# Patient Record
Sex: FEMALE | Race: WHITE | NOT HISPANIC OR LATINO
[De-identification: names, ages, dates, MRNs, and addresses within clinical notes are randomized per-mention and may not be internally consistent; named-entity substitution may affect disease eponyms.]

---

## 2019-01-04 ENCOUNTER — RECORD ABSTRACTING (OUTPATIENT)
Age: 49
End: 2019-01-04

## 2019-01-04 DIAGNOSIS — Z86.39 PERSONAL HISTORY OF OTHER ENDOCRINE, NUTRITIONAL AND METABOLIC DISEASE: ICD-10-CM

## 2019-01-04 DIAGNOSIS — F51.04 PSYCHOPHYSIOLOGIC INSOMNIA: ICD-10-CM

## 2019-01-04 DIAGNOSIS — Z82.49 FAMILY HISTORY OF ISCHEMIC HEART DISEASE AND OTHER DISEASES OF THE CIRCULATORY SYSTEM: ICD-10-CM

## 2019-01-04 DIAGNOSIS — Z87.39 PERSONAL HISTORY OF OTHER DISEASES OF THE MUSCULOSKELETAL SYSTEM AND CONNECTIVE TISSUE: ICD-10-CM

## 2019-01-04 DIAGNOSIS — N95.9 UNSPECIFIED MENOPAUSAL AND PERIMENOPAUSAL DISORDER: ICD-10-CM

## 2019-01-04 DIAGNOSIS — Z86.59 PERSONAL HISTORY OF OTHER MENTAL AND BEHAVIORAL DISORDERS: ICD-10-CM

## 2019-01-04 DIAGNOSIS — Z80.3 FAMILY HISTORY OF MALIGNANT NEOPLASM OF BREAST: ICD-10-CM

## 2019-01-04 DIAGNOSIS — Z87.891 PERSONAL HISTORY OF NICOTINE DEPENDENCE: ICD-10-CM

## 2019-01-04 DIAGNOSIS — J30.89 OTHER ALLERGIC RHINITIS: ICD-10-CM

## 2019-01-04 RX ORDER — MELATONIN 3 MG
TABLET, EXTENDED RELEASE ORAL
Refills: 0 | Status: ACTIVE | COMMUNITY

## 2019-01-04 RX ORDER — VENLAFAXINE HYDROCHLORIDE 75 MG/1
75 CAPSULE, EXTENDED RELEASE ORAL
Refills: 0 | Status: ACTIVE | COMMUNITY

## 2019-01-04 RX ORDER — RANITIDINE HYDROCHLORIDE 300 MG/1
300 CAPSULE ORAL
Refills: 0 | Status: ACTIVE | COMMUNITY

## 2019-01-04 RX ORDER — DICLOFENAC SODIUM 1 %
1 KIT TOPICAL
Refills: 0 | Status: ACTIVE | COMMUNITY

## 2019-01-04 RX ORDER — TRIAMCINOLONE ACETONIDE 55 UG/1
55 SPRAY, METERED NASAL
Refills: 0 | Status: ACTIVE | COMMUNITY

## 2019-01-04 RX ORDER — LEVALBUTEROL HYDROCHLORIDE 0.63 MG/3ML
0.63 SOLUTION RESPIRATORY (INHALATION)
Refills: 0 | Status: ACTIVE | COMMUNITY

## 2019-01-04 RX ORDER — AZELASTINE HYDROCHLORIDE AND FLUTICASONE PROPIONATE 137; 50 UG/1; UG/1
137-50 SPRAY, METERED NASAL
Refills: 0 | Status: ACTIVE | COMMUNITY

## 2019-01-04 RX ORDER — MONTELUKAST 10 MG/1
10 TABLET, FILM COATED ORAL
Refills: 0 | Status: ACTIVE | COMMUNITY

## 2019-01-09 ENCOUNTER — APPOINTMENT (OUTPATIENT)
Dept: ENDOCRINOLOGY | Facility: CLINIC | Age: 49
End: 2019-01-09
Payer: MEDICARE

## 2019-01-09 DIAGNOSIS — N91.2 AMENORRHEA, UNSPECIFIED: ICD-10-CM

## 2019-01-09 PROCEDURE — 36415 COLL VENOUS BLD VENIPUNCTURE: CPT

## 2019-01-16 ENCOUNTER — APPOINTMENT (OUTPATIENT)
Dept: ENDOCRINOLOGY | Facility: CLINIC | Age: 49
End: 2019-01-16

## 2019-01-16 LAB
25(OH)D3 SERPL-MCNC: 40.2 NG/ML
ANION GAP SERPL CALC-SCNC: 14 MMOL/L
BUN SERPL-MCNC: 16 MG/DL
CALCIUM SERPL-MCNC: 9.4 MG/DL
CALCIUM SERPL-MCNC: 9.4 MG/DL
CHLORIDE SERPL-SCNC: 102 MMOL/L
CO2 SERPL-SCNC: 25 MMOL/L
CREAT SERPL-MCNC: 0.83 MG/DL
FSH SERPL-MCNC: 20.9 IU/L
GLUCOSE SERPL-MCNC: 75 MG/DL
LH SERPL-ACNC: 11 IU/L
MAGNESIUM SERPL-MCNC: 2.2 MG/DL
PARATHYROID HORMONE INTACT: 41 PG/ML
PHOSPHATE SERPL-MCNC: 3.7 MG/DL
POTASSIUM SERPL-SCNC: 5.4 MMOL/L
SODIUM SERPL-SCNC: 141 MMOL/L
T4 FREE SERPL-MCNC: 1.7 NG/DL
TSH SERPL-ACNC: 0.84 UIU/ML

## 2019-03-14 ENCOUNTER — RECORD ABSTRACTING (OUTPATIENT)
Age: 49
End: 2019-03-14

## 2019-03-14 ENCOUNTER — APPOINTMENT (OUTPATIENT)
Dept: CARDIOLOGY | Facility: CLINIC | Age: 49
End: 2019-03-14
Payer: MEDICARE

## 2019-03-14 VITALS
DIASTOLIC BLOOD PRESSURE: 74 MMHG | BODY MASS INDEX: 27.19 KG/M2 | SYSTOLIC BLOOD PRESSURE: 120 MMHG | HEIGHT: 61 IN | WEIGHT: 144 LBS

## 2019-03-14 PROCEDURE — 99214 OFFICE O/P EST MOD 30 MIN: CPT

## 2019-03-14 PROCEDURE — 36415 COLL VENOUS BLD VENIPUNCTURE: CPT

## 2019-03-14 RX ORDER — AZELASTINE HYDROCHLORIDE AND FLUTICASONE PROPIONATE 137; 50 UG/1; UG/1
137-50 SPRAY, METERED NASAL
Refills: 0 | Status: DISCONTINUED | COMMUNITY
End: 2019-03-14

## 2019-03-14 RX ORDER — RANITIDINE 75 MG/1
TABLET ORAL
Refills: 0 | Status: DISCONTINUED | COMMUNITY
End: 2019-03-14

## 2019-03-14 NOTE — PHYSICAL EXAM
[General Appearance - Well Developed] : well developed [Normal Appearance] : normal appearance [Well Groomed] : well groomed [General Appearance - Well Nourished] : well nourished [No Deformities] : no deformities [General Appearance - In No Acute Distress] : no acute distress [Normal Conjunctiva] : the conjunctiva exhibited no abnormalities [Eyelids - No Xanthelasma] : the eyelids demonstrated no xanthelasmas [Normal Oral Mucosa] : normal oral mucosa [No Oral Pallor] : no oral pallor [No Oral Cyanosis] : no oral cyanosis [Normal Jugular Venous A Waves Present] : normal jugular venous A waves present [Normal Jugular Venous V Waves Present] : normal jugular venous V waves present [No Jugular Venous Bull A Waves] : no jugular venous bull A waves [Respiration, Rhythm And Depth] : normal respiratory rhythm and effort [Exaggerated Use Of Accessory Muscles For Inspiration] : no accessory muscle use [Auscultation Breath Sounds / Voice Sounds] : lungs were clear to auscultation bilaterally [Abdomen Soft] : soft [Abdomen Tenderness] : non-tender [Abdomen Mass (___ Cm)] : no abdominal mass palpated [Abnormal Walk] : normal gait [Gait - Sufficient For Exercise Testing] : the gait was sufficient for exercise testing [Nail Clubbing] : no clubbing of the fingernails [Cyanosis, Localized] : no localized cyanosis [Petechial Hemorrhages (___cm)] : no petechial hemorrhages [Skin Color & Pigmentation] : normal skin color and pigmentation [] : no rash [No Venous Stasis] : no venous stasis [Skin Lesions] : no skin lesions [No Skin Ulcers] : no skin ulcer [No Xanthoma] : no  xanthoma was observed [Oriented To Time, Place, And Person] : oriented to person, place, and time [Affect] : the affect was normal [Mood] : the mood was normal [No Anxiety] : not feeling anxious [5th Left ICS - MCL] : palpated at the 5th LICS in the midclavicular line [Normal] : normal [Normal Rate] : normal [Rhythm Regular] : regular [Normal S1] : normal S1 [Normal S2] : normal S2 [No Murmur] : no murmurs heard [No Abnormalities] : the abdominal aorta was not enlarged and no bruit was heard [Right Carotid Bruit] : no bruit heard over the right carotid [Left Carotid Bruit] : no bruit heard over the left carotid [Right Femoral Bruit] : no bruit heard over the right femoral artery [Left Femoral Bruit] : no bruit heard over the left femoral artery [Rt] : no varicose veins of the right leg [Lt] : no varicose veins of the left leg

## 2019-03-14 NOTE — HISTORY OF PRESENT ILLNESS
[FreeTextEntry1] :        The patient is a 48 year-old woman, daughter of MrMeg and Mrs. Camilo Ca, and who is well known to me from my previous practice at [facility]. She is under management of:\par        1) hyperlipidemia, on statin (Atorva)\par        2) long-standing asthma.\par        3) Treated Hypothyroidism, per Endo (Dr. Wolff)\par        4) Headache syndrome, per neuro (Dr. FLAQUITA Silva)\par        Currently: She is feeling well. She is continuing to take her therapy with Lipitor 10 mg once nightly. She is tolerating nicely.\par        Her asthma remains under good control. She is also using Synthroid chronically for hypothyroidism. \par        Currently: has been taking and tolerating Lipitor without issue. No side effects. She is now used to it. Has seen Dr. Kaufman; had flu vaccination last fall.\par        And, has been seeing neuro about her recurrent and frequent headaches.\par        **Going to Dr. Ludin Dia for pulmonary, re: Asthma and Cough.\par        And, ENT (Dr. King)

## 2019-03-14 NOTE — ASSESSMENT
[FreeTextEntry1] :  Assessment: \par 1. Mixed hyperlipidemia - E78.2 (Primary)  \par 2. Asthma - J45.909  \par 3. Hypothyroidism - E03.9  \par 4. Vitamin D deficiency - E55.9  \par 5. Headache - R51  \par 6. Primary insomnia - F51.01   \par \par Discussion:\par  1) Hyperlipidemia- on Lipitor. At goal.\par 2) Hypothyroidism and osteopenia--per [place] (Dr. Wolff)\par 3) Asthma-stable\par 4) Insomnia-chronic X many, many years. Agree with melatonin. I have recommended an exercise van too. She gets no exercise. \par

## 2019-03-15 LAB
ALBUMIN SERPL ELPH-MCNC: 4.6 G/DL
ALP BLD-CCNC: 84 U/L
ALT SERPL-CCNC: 13 U/L
ANION GAP SERPL CALC-SCNC: 13 MMOL/L
AST SERPL-CCNC: 20 U/L
BASOPHILS # BLD AUTO: 0.03 K/UL
BASOPHILS NFR BLD AUTO: 0.5 %
BILIRUB SERPL-MCNC: 0.3 MG/DL
BUN SERPL-MCNC: 15 MG/DL
CALCIUM SERPL-MCNC: 9.4 MG/DL
CHLORIDE SERPL-SCNC: 99 MMOL/L
CHOLEST SERPL-MCNC: 185 MG/DL
CHOLEST/HDLC SERPL: 3.9 RATIO
CO2 SERPL-SCNC: 27 MMOL/L
CREAT SERPL-MCNC: 0.81 MG/DL
EOSINOPHIL # BLD AUTO: 0.12 K/UL
EOSINOPHIL NFR BLD AUTO: 1.9 %
GLUCOSE SERPL-MCNC: 101 MG/DL
HCT VFR BLD CALC: 44.2 %
HDLC SERPL-MCNC: 48 MG/DL
HGB BLD-MCNC: 13.5 G/DL
IMM GRANULOCYTES NFR BLD AUTO: 0.2 %
LDLC SERPL CALC-MCNC: 111 MG/DL
LYMPHOCYTES # BLD AUTO: 2.89 K/UL
LYMPHOCYTES NFR BLD AUTO: 44.8 %
MAN DIFF?: NORMAL
MCHC RBC-ENTMCNC: 28.8 PG
MCHC RBC-ENTMCNC: 30.5 GM/DL
MCV RBC AUTO: 94.4 FL
MONOCYTES # BLD AUTO: 0.5 K/UL
MONOCYTES NFR BLD AUTO: 7.8 %
NEUTROPHILS # BLD AUTO: 2.9 K/UL
NEUTROPHILS NFR BLD AUTO: 44.8 %
PLATELET # BLD AUTO: 221 K/UL
POTASSIUM SERPL-SCNC: 4.3 MMOL/L
PROT SERPL-MCNC: 7.1 G/DL
RBC # BLD: 4.68 M/UL
RBC # FLD: 13.4 %
SODIUM SERPL-SCNC: 139 MMOL/L
TRIGL SERPL-MCNC: 132 MG/DL
WBC # FLD AUTO: 6.45 K/UL

## 2019-03-20 NOTE — PHYSICAL EXAM
[General Appearance - Well Developed] : well developed [Normal Appearance] : normal appearance [Well Groomed] : well groomed [General Appearance - Well Nourished] : well nourished [No Deformities] : no deformities [General Appearance - In No Acute Distress] : no acute distress [Normal Conjunctiva] : the conjunctiva exhibited no abnormalities [Eyelids - No Xanthelasma] : the eyelids demonstrated no xanthelasmas [Normal Oral Mucosa] : normal oral mucosa [No Oral Pallor] : no oral pallor [No Oral Cyanosis] : no oral cyanosis [Normal Jugular Venous A Waves Present] : normal jugular venous A waves present [Normal Jugular Venous V Waves Present] : normal jugular venous V waves present [No Jugular Venous Bull A Waves] : no jugular venous bull A waves [Respiration, Rhythm And Depth] : normal respiratory rhythm and effort [Exaggerated Use Of Accessory Muscles For Inspiration] : no accessory muscle use [Auscultation Breath Sounds / Voice Sounds] : lungs were clear to auscultation bilaterally [5th Left ICS - MCL] : palpated at the 5th LICS in the midclavicular line [Normal] : normal [Normal Rate] : normal [Rhythm Regular] : regular [Normal S1] : normal S1 [Normal S2] : normal S2 [No Murmur] : no murmurs heard [No Abnormalities] : the abdominal aorta was not enlarged and no bruit was heard [Abdomen Soft] : soft [Abdomen Tenderness] : non-tender [Abdomen Mass (___ Cm)] : no abdominal mass palpated [Abnormal Walk] : normal gait [Gait - Sufficient For Exercise Testing] : the gait was sufficient for exercise testing [Nail Clubbing] : no clubbing of the fingernails [Cyanosis, Localized] : no localized cyanosis [Petechial Hemorrhages (___cm)] : no petechial hemorrhages [Skin Color & Pigmentation] : normal skin color and pigmentation [] : no rash [No Venous Stasis] : no venous stasis [Skin Lesions] : no skin lesions [No Skin Ulcers] : no skin ulcer [No Xanthoma] : no  xanthoma was observed [Oriented To Time, Place, And Person] : oriented to person, place, and time [Affect] : the affect was normal [Mood] : the mood was normal [No Anxiety] : not feeling anxious [Right Carotid Bruit] : no bruit heard over the right carotid [Left Carotid Bruit] : no bruit heard over the left carotid [Right Femoral Bruit] : no bruit heard over the right femoral artery [Left Femoral Bruit] : no bruit heard over the left femoral artery [Rt] : no varicose veins of the right leg [Lt] : no varicose veins of the left leg

## 2019-03-20 NOTE — HISTORY OF PRESENT ILLNESS
[FreeTextEntry1] :        The patient is a 48 year-old woman, daughter of MrMeg and Mrs. Camilo Ca, and who is well known to me from my previous practice at [facility]. She is under management of:\par        1) hyperlipidemia, on statin (Atorva)\par        2) long-standing asthma.\par        3) Treated Hypothyroidism, per Endo (Dr. Wolff)\par        4) Headache syndrome, per neuro (Dr. FLAQUITA Silva)\par        Currently: She is feeling well. She is continuing to take her therapy with Lipitor 10 mg once nightly. She is tolerating nicely.\par        Her asthma remains under good control. She is also using Synthroid chronically for hypothyroidism. \par        Currently: has been taking and tolerating Lipitor without issue. No side effects. She is now used to it. Has seen Dr. Kaufman; had flu vaccination last fall.\par        And, has been seeing neuro about her recurrent and frequent headaches.\par        **Going to Dr. Ludin Dia for pulmonary, re: Asthma and Cough.\par        And, ENT (Dr. King)

## 2019-03-27 ENCOUNTER — APPOINTMENT (OUTPATIENT)
Dept: ENDOCRINOLOGY | Facility: CLINIC | Age: 49
End: 2019-03-27
Payer: MEDICARE

## 2019-03-27 PROCEDURE — 36415 COLL VENOUS BLD VENIPUNCTURE: CPT

## 2019-03-29 ENCOUNTER — APPOINTMENT (OUTPATIENT)
Dept: ENDOCRINOLOGY | Facility: CLINIC | Age: 49
End: 2019-03-29
Payer: MEDICARE

## 2019-03-29 VITALS
HEART RATE: 74 BPM | SYSTOLIC BLOOD PRESSURE: 114 MMHG | BODY MASS INDEX: 27.38 KG/M2 | WEIGHT: 145 LBS | HEIGHT: 61 IN | DIASTOLIC BLOOD PRESSURE: 72 MMHG

## 2019-03-29 LAB
25(OH)D3 SERPL-MCNC: 44.1 NG/ML
CALCIUM SERPL-MCNC: 9.3 MG/DL
FSH SERPL-MCNC: 19.9 IU/L
LH SERPL-ACNC: 11.2 IU/L
MAGNESIUM SERPL-MCNC: 2 MG/DL
PARATHYROID HORMONE INTACT: 57 PG/ML
PHOSPHATE SERPL-MCNC: 3.6 MG/DL
T4 FREE SERPL-MCNC: 1.6 NG/DL
TSH SERPL-ACNC: 0.55 UIU/ML

## 2019-03-29 PROCEDURE — 99214 OFFICE O/P EST MOD 30 MIN: CPT

## 2019-03-29 RX ORDER — CHROMIUM 200 MCG
1000 TABLET ORAL
Refills: 0 | Status: DISCONTINUED | COMMUNITY
End: 2019-03-29

## 2019-03-29 RX ORDER — VENLAFAXINE HYDROCHLORIDE 150 MG/1
150 CAPSULE, EXTENDED RELEASE ORAL
Refills: 0 | Status: DISCONTINUED | COMMUNITY
End: 2019-03-29

## 2019-03-29 NOTE — HISTORY OF PRESENT ILLNESS
[FreeTextEntry1] : Ms. ALY ARMAS is a 48 year old female\par coming for f/u hypothyroidism, osteoporosis new , has also early menopause last period 2013\par last seen 6/18\par one Prolia 2/19, first was 8/18 so far rerecived twice with no side effects\par \par        Dr Raz ONOFRE started her on Omeprazole 10mg bid \par        on Vit D 2,000 IU qd for at least a month\par        has osteoporosis \par        had not been taking her meds end of December, also January and February each time for a week at a time due to gastroenteritis\par        labs reviewed with the pt, has low IGF1\par        TFts normal\par        has dot-cisterna magna sees Dr Silva Neurology last CT scan and last MRI head reviewed\par        labs done 2/19/15 showed TSH 2.6, FT4 0.8, none since thyroid was changed\par        has been on Synthroid since she was 16yo, on Synthroid 88mcg NAMAN changed 9/2016\par        has Osteopenia DEXA scan done by her Gyn at [facility] referred at [facility] done 1/17 mildy decreased , T score -2 both hip and spine \par        calcium 500+ Vit D qd takes it 4hr after Synthroid \par        iPTH 60, \par         takes Vit D 2,000 IU qd \par        no persistent Therapy with prednisone, has asthma \par        has been on Phentermine by PCP for 3 months lost 20lb , then 10lb more since she was sick\par        thyroid US reviewed showed small nodule 5mm nearby her thyroid, lymph node versus parathyroid adenoma, however iPTH 60 with low D, will repeat once D level is normal

## 2019-04-09 ENCOUNTER — RESULT REVIEW (OUTPATIENT)
Age: 49
End: 2019-04-09

## 2019-07-17 ENCOUNTER — LABORATORY RESULT (OUTPATIENT)
Age: 49
End: 2019-07-17

## 2019-07-17 ENCOUNTER — APPOINTMENT (OUTPATIENT)
Dept: ENDOCRINOLOGY | Facility: CLINIC | Age: 49
End: 2019-07-17
Payer: MEDICARE

## 2019-07-17 PROCEDURE — ZZZZZ: CPT

## 2019-07-18 LAB
25(OH)D3 SERPL-MCNC: 40 NG/ML
ALBUMIN SERPL ELPH-MCNC: 4.5 G/DL
ALP BLD-CCNC: 73 U/L
ALT SERPL-CCNC: 16 U/L
ANION GAP SERPL CALC-SCNC: 12 MMOL/L
AST SERPL-CCNC: 21 U/L
BILIRUB SERPL-MCNC: 0.3 MG/DL
BUN SERPL-MCNC: 14 MG/DL
CALCIUM SERPL-MCNC: 9.6 MG/DL
CALCIUM SERPL-MCNC: 9.6 MG/DL
CHLORIDE SERPL-SCNC: 100 MMOL/L
CO2 SERPL-SCNC: 28 MMOL/L
CREAT SERPL-MCNC: 0.93 MG/DL
GLUCOSE SERPL-MCNC: 91 MG/DL
MAGNESIUM SERPL-MCNC: 1.9 MG/DL
PARATHYROID HORMONE INTACT: 37 PG/ML
POTASSIUM SERPL-SCNC: 4 MMOL/L
PROT SERPL-MCNC: 6.9 G/DL
SODIUM SERPL-SCNC: 140 MMOL/L
T4 FREE SERPL-MCNC: 1.3 NG/DL
TSH SERPL-ACNC: 1.16 UIU/ML

## 2019-07-19 LAB
CHOLEST SERPL-MCNC: 197 MG/DL
CHOLEST/HDLC SERPL: 3.3 RATIO
HDLC SERPL-MCNC: 59 MG/DL
LDLC SERPL CALC-MCNC: 113 MG/DL
TRIGL SERPL-MCNC: 123 MG/DL

## 2019-07-24 ENCOUNTER — APPOINTMENT (OUTPATIENT)
Dept: ENDOCRINOLOGY | Facility: CLINIC | Age: 49
End: 2019-07-24
Payer: MEDICARE

## 2019-07-24 VITALS
OXYGEN SATURATION: 99 % | WEIGHT: 149 LBS | BODY MASS INDEX: 28.13 KG/M2 | HEIGHT: 61 IN | RESPIRATION RATE: 16 BRPM | DIASTOLIC BLOOD PRESSURE: 70 MMHG | SYSTOLIC BLOOD PRESSURE: 116 MMHG | HEART RATE: 78 BPM

## 2019-07-24 LAB
COLLAGEN NTX UR-SCNC: 10
CREAT UR-MCNC: 127 MG/DL

## 2019-07-24 PROCEDURE — 99214 OFFICE O/P EST MOD 30 MIN: CPT

## 2019-07-24 RX ORDER — ONDANSETRON 4 MG/1
4 TABLET ORAL
Refills: 0 | Status: DISCONTINUED | COMMUNITY
End: 2019-07-24

## 2019-07-24 NOTE — REVIEW OF SYSTEMS
[Fatigue] : fatigue [Joint Pain] : joint pain [Back Pain] : back pain [Headache] : headaches [Dry Skin] : dry skin [Depression] : depression [Anxiety] : anxiety [FreeTextEntry8] : last period 41 yo , has had irregular periods before  [Negative] : Heme/Lymph

## 2019-07-24 NOTE — PHYSICAL EXAM
[Alert] : alert [No Acute Distress] : no acute distress [Well Nourished] : well nourished [Normal Sclera/Conjunctiva] : normal sclera/conjunctiva [EOMI] : extra ocular movement intact [Well Developed] : well developed [No Proptosis] : no proptosis [Normal Oropharynx] : the oropharynx was normal [No Accessory Muscle Use] : no accessory muscle use [No Respiratory Distress] : no respiratory distress [Clear to Auscultation] : lungs were clear to auscultation bilaterally [Normal Rate] : heart rate was normal  [Normal S1, S2] : normal S1 and S2 [Regular Rhythm] : with a regular rhythm [Pedal Pulses Normal] : the pedal pulses are present [No Edema] : there was no peripheral edema [Soft] : abdomen soft [Not Tender] : non-tender [Normal Bowel Sounds] : normal bowel sounds [Not Distended] : not distended [Post Cervical Nodes] : posterior cervical nodes [Anterior Cervical Nodes] : anterior cervical nodes [Axillary Nodes] : axillary nodes [Normal] : normal and non tender [No Spinal Tenderness] : no spinal tenderness [Normal Gait] : normal gait [Spine Straight] : spine straight [No Stigmata of Cushings Syndrome] : no stigmata of cushings syndrome [No Rash] : no rash [Normal Strength/Tone] : muscle strength and tone were normal [Acanthosis Nigricans] : no acanthosis nigricans [Normal Reflexes] : deep tendon reflexes were 2+ and symmetric [No Tremors] : no tremors [Oriented x3] : oriented to person, place, and time [de-identified] : mildly enlarged nodular thyroid

## 2019-07-24 NOTE — HISTORY OF PRESENT ILLNESS
[FreeTextEntry1] : Ms. ALY ARMAS is a 48 year old female\par coming for f/u hypothyroidism, osteoporosis new , has also early menopause last period 2013\par last seen 6/18\par one Prolia 2/19, first was 8/18 so far received twice with no side effects\par \par        Dr Raz ONOFRE started her on Omeprazole 10mg bid \par        on Vit D 2,000 IU qd for at least a month\par        has osteoporosis \par        had not been taking her meds end of December, also January and February each time for a week at a time due to gastroenteritis\par        labs reviewed with the pt, has low IGF1\par        TFts normal\par        has dot-cisterna magna sees Dr Silva Neurology last CT scan and last MRI head reviewed\par        labs done 2/19/15 showed TSH 2.6, FT4 0.8, none since thyroid was changed\par        has been on Synthroid since she was 16yo, on Synthroid 88mcg NAMAN changed 9/2016\par        has Osteopenia DEXA scan done by her Gyn at [facility] referred at [facility] done 1/17 mildy decreased , T score -2 both hip and spine \par repeated DEXA 5/19 c/w osteoporosis T score -2.6 left femur, improved , forearm osteopenia -2.0\par        calcium 500+ Vit D qd takes it 4hr after Synthroid \par        iPTH 60, \par         takes Vit D 2,000 IU qd \par        no persistent Therapy with prednisone, has asthma \par        has been on Phentermine by PCP for 3 months lost 20lb , then 10lb more since she was sick\par        thyroid US reviewed showed small nodule 5mm nearby her thyroid, lymph node versus parathyroid adenoma, however iPTH 60 with low D, will repeat once D level is normal\par \par labs reviewed very good

## 2019-09-05 ENCOUNTER — APPOINTMENT (OUTPATIENT)
Dept: CARDIOLOGY | Facility: CLINIC | Age: 49
End: 2019-09-05
Payer: MEDICARE

## 2019-09-05 ENCOUNTER — NON-APPOINTMENT (OUTPATIENT)
Age: 49
End: 2019-09-05

## 2019-09-05 VITALS
DIASTOLIC BLOOD PRESSURE: 64 MMHG | WEIGHT: 149 LBS | BODY MASS INDEX: 28.13 KG/M2 | HEIGHT: 61 IN | SYSTOLIC BLOOD PRESSURE: 102 MMHG

## 2019-09-05 PROCEDURE — 36415 COLL VENOUS BLD VENIPUNCTURE: CPT

## 2019-09-05 PROCEDURE — 93000 ELECTROCARDIOGRAM COMPLETE: CPT

## 2019-09-05 PROCEDURE — 99214 OFFICE O/P EST MOD 30 MIN: CPT

## 2019-09-05 NOTE — HISTORY OF PRESENT ILLNESS
[FreeTextEntry1] :        The patient is a 48 year-old woman, daughter of MrMeg and Mrs. Camilo Ca, and who is well known to me from my previous practice at [facility]. She is under management of:\par        1) hyperlipidemia, on statin (Atorva)\par        2) long-standing asthma.\par        3) Treated Hypothyroidism, per Endo (Dr. Wolff)\par        4) Headache syndrome, per neuro (Dr. FLAQUITA Silva)\par        Currently: She is feeling well. She is continuing to take her therapy with Lipitor 10 mg once nightly. She is tolerating nicely.\par        Her asthma remains under good control. She is also using Synthroid chronically for hypothyroidism. \par        Currently: has been taking and tolerating Lipitor without issue. No side effects. She is now used to it. Has seen Dr. Kaufman; had flu vaccination last fall.\par        And, has been seeing neuro about her recurrent and frequent headaches.\par        **Going to Dr. Ludin Dia for pulmonary, re: Asthma and Cough.\par        And, ENT (Dr. King)

## 2019-09-05 NOTE — PHYSICAL EXAM
[General Appearance - Well Developed] : well developed [Normal Appearance] : normal appearance [Well Groomed] : well groomed [General Appearance - Well Nourished] : well nourished [No Deformities] : no deformities [General Appearance - In No Acute Distress] : no acute distress [Normal Conjunctiva] : the conjunctiva exhibited no abnormalities [Eyelids - No Xanthelasma] : the eyelids demonstrated no xanthelasmas [Normal Oral Mucosa] : normal oral mucosa [No Oral Pallor] : no oral pallor [No Oral Cyanosis] : no oral cyanosis [Normal Jugular Venous A Waves Present] : normal jugular venous A waves present [Normal Jugular Venous V Waves Present] : normal jugular venous V waves present [No Jugular Venous Bull A Waves] : no jugular venous bull A waves [Respiration, Rhythm And Depth] : normal respiratory rhythm and effort [Exaggerated Use Of Accessory Muscles For Inspiration] : no accessory muscle use [Auscultation Breath Sounds / Voice Sounds] : lungs were clear to auscultation bilaterally [Abdomen Soft] : soft [Abdomen Tenderness] : non-tender [Abnormal Walk] : normal gait [Abdomen Mass (___ Cm)] : no abdominal mass palpated [Gait - Sufficient For Exercise Testing] : the gait was sufficient for exercise testing [Nail Clubbing] : no clubbing of the fingernails [Cyanosis, Localized] : no localized cyanosis [Petechial Hemorrhages (___cm)] : no petechial hemorrhages [Skin Color & Pigmentation] : normal skin color and pigmentation [] : no rash [No Venous Stasis] : no venous stasis [Skin Lesions] : no skin lesions [No Skin Ulcers] : no skin ulcer [No Xanthoma] : no  xanthoma was observed [Oriented To Time, Place, And Person] : oriented to person, place, and time [Affect] : the affect was normal [Mood] : the mood was normal [No Anxiety] : not feeling anxious [5th Left ICS - MCL] : palpated at the 5th LICS in the midclavicular line [Normal] : normal [Normal Rate] : normal [Rhythm Regular] : regular [Normal S1] : normal S1 [Normal S2] : normal S2 [No Murmur] : no murmurs heard [No Abnormalities] : the abdominal aorta was not enlarged and no bruit was heard [Right Carotid Bruit] : no bruit heard over the right carotid [Left Carotid Bruit] : no bruit heard over the left carotid [Left Femoral Bruit] : no bruit heard over the left femoral artery [Right Femoral Bruit] : no bruit heard over the right femoral artery [Lt] : no varicose veins of the left leg [Rt] : no varicose veins of the right leg

## 2019-09-06 LAB
ALBUMIN SERPL ELPH-MCNC: 4.7 G/DL
ALP BLD-CCNC: 75 U/L
ALT SERPL-CCNC: 20 U/L
ANION GAP SERPL CALC-SCNC: 14 MMOL/L
AST SERPL-CCNC: 19 U/L
BASOPHILS # BLD AUTO: 0.03 K/UL
BASOPHILS NFR BLD AUTO: 0.5 %
BILIRUB SERPL-MCNC: 0.3 MG/DL
BUN SERPL-MCNC: 11 MG/DL
CALCIUM SERPL-MCNC: 9.6 MG/DL
CHLORIDE SERPL-SCNC: 101 MMOL/L
CHOLEST SERPL-MCNC: 187 MG/DL
CHOLEST/HDLC SERPL: 3.6 RATIO
CO2 SERPL-SCNC: 26 MMOL/L
CREAT SERPL-MCNC: 0.83 MG/DL
EOSINOPHIL # BLD AUTO: 0.16 K/UL
EOSINOPHIL NFR BLD AUTO: 2.7 %
GLUCOSE SERPL-MCNC: 93 MG/DL
HCT VFR BLD CALC: 39.9 %
HDLC SERPL-MCNC: 52 MG/DL
HGB BLD-MCNC: 12.8 G/DL
IMM GRANULOCYTES NFR BLD AUTO: 0.2 %
LDLC SERPL CALC-MCNC: 111 MG/DL
LYMPHOCYTES # BLD AUTO: 3.05 K/UL
LYMPHOCYTES NFR BLD AUTO: 51.4 %
MAN DIFF?: NORMAL
MCHC RBC-ENTMCNC: 29.9 PG
MCHC RBC-ENTMCNC: 32.1 GM/DL
MCV RBC AUTO: 93.2 FL
MONOCYTES # BLD AUTO: 0.47 K/UL
MONOCYTES NFR BLD AUTO: 7.9 %
NEUTROPHILS # BLD AUTO: 2.21 K/UL
NEUTROPHILS NFR BLD AUTO: 37.3 %
PLATELET # BLD AUTO: 244 K/UL
POTASSIUM SERPL-SCNC: 4.3 MMOL/L
PROT SERPL-MCNC: 6.8 G/DL
RBC # BLD: 4.28 M/UL
RBC # FLD: 13.2 %
SODIUM SERPL-SCNC: 141 MMOL/L
T4 FREE SERPL-MCNC: 1.2 NG/DL
TRIGL SERPL-MCNC: 121 MG/DL
TSH SERPL-ACNC: 1.44 UIU/ML
WBC # FLD AUTO: 5.93 K/UL

## 2020-01-02 ENCOUNTER — APPOINTMENT (OUTPATIENT)
Dept: CARDIOLOGY | Facility: CLINIC | Age: 50
End: 2020-01-02

## 2020-03-03 ENCOUNTER — LABORATORY RESULT (OUTPATIENT)
Age: 50
End: 2020-03-03

## 2020-03-03 ENCOUNTER — APPOINTMENT (OUTPATIENT)
Dept: CARDIOLOGY | Facility: CLINIC | Age: 50
End: 2020-03-03
Payer: MEDICARE

## 2020-03-03 ENCOUNTER — NON-APPOINTMENT (OUTPATIENT)
Age: 50
End: 2020-03-03

## 2020-03-03 VITALS
HEIGHT: 61 IN | BODY MASS INDEX: 28.32 KG/M2 | WEIGHT: 150 LBS | SYSTOLIC BLOOD PRESSURE: 110 MMHG | DIASTOLIC BLOOD PRESSURE: 60 MMHG

## 2020-03-03 PROCEDURE — 99214 OFFICE O/P EST MOD 30 MIN: CPT

## 2020-03-03 PROCEDURE — 93000 ELECTROCARDIOGRAM COMPLETE: CPT

## 2020-03-03 NOTE — HISTORY OF PRESENT ILLNESS
[FreeTextEntry1] :        The patient is a 49 year-old woman, daughter of MrMeg and Mrs. Camilo Ca, and who is well known to me from my previous practice at [facility]. She is under management of:\par        1) hyperlipidemia, on statin (Atorva)\par        2) long-standing asthma.\par        3) Treated Hypothyroidism, per Endo (Dr. Wolff)\par        4) Headache syndrome, per neuro (Dr. FLAQUITA Silva)\par        Currently: She is feeling well. She is continuing to take her therapy with Lipitor 10 mg once nightly. She is tolerating nicely.\par        Her asthma remains under good control. She is also using Synthroid chronically for hypothyroidism. \par        Currently: has been taking and tolerating Lipitor without issue. No side effects. She is now used to it. Has seen Dr. aKufman; had flu vaccination last fall.\par        And, has been seeing neuro about her recurrent and frequent headaches.\par        **Going to Dr. Ludin Dia for pulmonary, re: Asthma and Cough.\par        And, ENT (Dr. King)

## 2020-03-03 NOTE — PHYSICAL EXAM
[Normal Appearance] : normal appearance [General Appearance - Well Developed] : well developed [Well Groomed] : well groomed [No Deformities] : no deformities [General Appearance - Well Nourished] : well nourished [Normal Conjunctiva] : the conjunctiva exhibited no abnormalities [General Appearance - In No Acute Distress] : no acute distress [Normal Oral Mucosa] : normal oral mucosa [Eyelids - No Xanthelasma] : the eyelids demonstrated no xanthelasmas [No Oral Cyanosis] : no oral cyanosis [No Oral Pallor] : no oral pallor [Normal Jugular Venous A Waves Present] : normal jugular venous A waves present [Normal Jugular Venous V Waves Present] : normal jugular venous V waves present [No Jugular Venous Bull A Waves] : no jugular venous bull A waves [Respiration, Rhythm And Depth] : normal respiratory rhythm and effort [Exaggerated Use Of Accessory Muscles For Inspiration] : no accessory muscle use [Auscultation Breath Sounds / Voice Sounds] : lungs were clear to auscultation bilaterally [Abdomen Soft] : soft [Abdomen Tenderness] : non-tender [Abdomen Mass (___ Cm)] : no abdominal mass palpated [Abnormal Walk] : normal gait [Gait - Sufficient For Exercise Testing] : the gait was sufficient for exercise testing [Nail Clubbing] : no clubbing of the fingernails [Cyanosis, Localized] : no localized cyanosis [Petechial Hemorrhages (___cm)] : no petechial hemorrhages [Skin Color & Pigmentation] : normal skin color and pigmentation [] : no rash [No Venous Stasis] : no venous stasis [Skin Lesions] : no skin lesions [No Skin Ulcers] : no skin ulcer [No Xanthoma] : no  xanthoma was observed [Oriented To Time, Place, And Person] : oriented to person, place, and time [Affect] : the affect was normal [Mood] : the mood was normal [No Anxiety] : not feeling anxious [5th Left ICS - MCL] : palpated at the 5th LICS in the midclavicular line [Normal Rate] : normal [Normal] : normal [Rhythm Regular] : regular [Normal S1] : normal S1 [Normal S2] : normal S2 [No Murmur] : no murmurs heard [No Abnormalities] : the abdominal aorta was not enlarged and no bruit was heard [Right Carotid Bruit] : no bruit heard over the right carotid [Left Carotid Bruit] : no bruit heard over the left carotid [Right Femoral Bruit] : no bruit heard over the right femoral artery [Left Femoral Bruit] : no bruit heard over the left femoral artery [Lt] : no varicose veins of the left leg [Rt] : no varicose veins of the right leg

## 2020-03-04 LAB
BASOPHILS # BLD AUTO: 0.03 K/UL
BASOPHILS NFR BLD AUTO: 0.5 %
CHOLEST SERPL-MCNC: 213 MG/DL
CHOLEST/HDLC SERPL: 3.8 RATIO
EOSINOPHIL # BLD AUTO: 0.08 K/UL
EOSINOPHIL NFR BLD AUTO: 1.3 %
HCT VFR BLD CALC: 41.7 %
HDLC SERPL-MCNC: 56 MG/DL
HGB BLD-MCNC: 14 G/DL
IMM GRANULOCYTES NFR BLD AUTO: 0.2 %
LDLC SERPL CALC-MCNC: 135 MG/DL
LYMPHOCYTES # BLD AUTO: 2.88 K/UL
LYMPHOCYTES NFR BLD AUTO: 45.7 %
MAN DIFF?: NORMAL
MCHC RBC-ENTMCNC: 30.8 PG
MCHC RBC-ENTMCNC: 33.6 GM/DL
MCV RBC AUTO: 91.9 FL
MONOCYTES # BLD AUTO: 0.42 K/UL
MONOCYTES NFR BLD AUTO: 6.7 %
NEUTROPHILS # BLD AUTO: 2.88 K/UL
NEUTROPHILS NFR BLD AUTO: 45.6 %
PLATELET # BLD AUTO: 274 K/UL
RBC # BLD: 4.54 M/UL
RBC # FLD: 13.2 %
TRIGL SERPL-MCNC: 109 MG/DL
WBC # FLD AUTO: 6.3 K/UL

## 2020-03-11 ENCOUNTER — APPOINTMENT (OUTPATIENT)
Dept: ENDOCRINOLOGY | Facility: CLINIC | Age: 50
End: 2020-03-11
Payer: MEDICARE

## 2020-03-11 VITALS
DIASTOLIC BLOOD PRESSURE: 64 MMHG | OXYGEN SATURATION: 99 % | HEIGHT: 61 IN | HEART RATE: 68 BPM | WEIGHT: 149 LBS | BODY MASS INDEX: 28.13 KG/M2 | SYSTOLIC BLOOD PRESSURE: 106 MMHG

## 2020-03-11 PROCEDURE — 99214 OFFICE O/P EST MOD 30 MIN: CPT

## 2020-03-11 NOTE — PHYSICAL EXAM
[Alert] : alert [No Acute Distress] : no acute distress [Well Nourished] : well nourished [Well Developed] : well developed [Normal Sclera/Conjunctiva] : normal sclera/conjunctiva [EOMI] : extra ocular movement intact [No Proptosis] : no proptosis [Normal Oropharynx] : the oropharynx was normal [No Respiratory Distress] : no respiratory distress [No Accessory Muscle Use] : no accessory muscle use [Clear to Auscultation] : lungs were clear to auscultation bilaterally [Normal Rate] : heart rate was normal  [Normal S1, S2] : normal S1 and S2 [Regular Rhythm] : with a regular rhythm [Pedal Pulses Normal] : the pedal pulses are present [No Edema] : there was no peripheral edema [Normal Bowel Sounds] : normal bowel sounds [Not Tender] : non-tender [Soft] : abdomen soft [Not Distended] : not distended [Post Cervical Nodes] : posterior cervical nodes [Anterior Cervical Nodes] : anterior cervical nodes [Axillary Nodes] : axillary nodes [Normal] : normal and non tender [No Spinal Tenderness] : no spinal tenderness [Spine Straight] : spine straight [No Stigmata of Cushings Syndrome] : no stigmata of cushings syndrome [Normal Gait] : normal gait [Normal Strength/Tone] : muscle strength and tone were normal [No Rash] : no rash [Normal Reflexes] : deep tendon reflexes were 2+ and symmetric [No Tremors] : no tremors [Oriented x3] : oriented to person, place, and time [Acanthosis Nigricans] : no acanthosis nigricans [de-identified] : mildly enlarged nodular thyroid

## 2020-03-11 NOTE — HISTORY OF PRESENT ILLNESS
[FreeTextEntry1] : Ms. ALY ARMAS is a 48 year old female\par coming for f/u hypothyroidism, osteoporosis new , has also early menopause last period 2013\par last seen 6/18\par  Prolia 7/30/2018\par 2/13/2019\par 9/4/2019\par \par \par        Dr Raz ONOFRE started her on Omeprazole 10mg bid \par        on Vit D 2,000 IU qd for at least a month\par        has osteoporosis \par        had not been taking her meds end of December, also January and February each time for a week at a time due to gastroenteritis\par        labs reviewed with the pt, has low IGF1\par        TFts normal\par        has dot-cisterna magna sees Dr Silva Neurology last CT scan and last MRI head reviewed\par        labs done 2/19/15 showed TSH 2.6, FT4 0.8, none since thyroid was changed\par        has been on Synthroid since she was 16yo, on Synthroid 88mcg NAMAN changed 9/2016\par        has Osteopenia DEXA scan done by her Gyn at [facility] referred at [facility] done 1/17 mildy decreased , T score -2 both hip and spine \par repeated DEXA 5/19 c/w osteoporosis T score -2.6 left femur, improved , forearm osteopenia -2.0\par        calcium 500+ Vit D qd takes it 4hr after Synthroid \par        iPTH 60, \par         takes Vit D 2,000 IU qd \par        no persistent Therapy with prednisone, has asthma \par        has been on Phentermine by PCP for 3 months lost 20lb , then 10lb more since she was sick\par        thyroid US reviewed showed small nodule 5mm nearby her thyroid, lymph node versus parathyroid adenoma, however iPTH 60 with low D, will repeat once D level is normal\par \par labs reviewed very good

## 2020-03-11 NOTE — REVIEW OF SYSTEMS
[Fatigue] : fatigue [Joint Pain] : joint pain [Back Pain] : back pain [Dry Skin] : dry skin [Headache] : headaches [Depression] : depression [Anxiety] : anxiety [Negative] : Heme/Lymph [FreeTextEntry8] : last period 43 yo , has had irregular periods before

## 2020-05-21 ENCOUNTER — APPOINTMENT (OUTPATIENT)
Dept: CARDIOLOGY | Facility: CLINIC | Age: 50
End: 2020-05-21
Payer: MEDICARE

## 2020-05-21 VITALS
HEIGHT: 61 IN | DIASTOLIC BLOOD PRESSURE: 80 MMHG | WEIGHT: 149 LBS | BODY MASS INDEX: 28.13 KG/M2 | SYSTOLIC BLOOD PRESSURE: 124 MMHG

## 2020-05-21 PROCEDURE — 99213 OFFICE O/P EST LOW 20 MIN: CPT

## 2020-05-21 NOTE — ASSESSMENT
[FreeTextEntry1] :  Assessment: \par 1. Mixed hyperlipidemia - E78.2 (Primary)  \par 2. Asthma - J45.909  \par 3. Hypothyroidism - E03.9  \par 4. Vitamin D deficiency - E55.9  \par 5. Headache - R51  \par 6. Primary insomnia - F51.01   \par \par Discussion:\par  1) Hyperlipidemia- on Lipitor. LAst LDL elevated at 135. Will likely increase her atorvastatin to 20- next visit, if not better.\par 2) Hypothyroidism and osteopenia--per Endo (Dr. Wolff)\par 3) Asthma-stable\par 4) Insomnia-chronic X many, many years. Agree with melatonin. I have recommended an exercise rogram too. She gets no exercise. \par

## 2020-05-21 NOTE — PHYSICAL EXAM
[General Appearance - Well Developed] : well developed [Normal Appearance] : normal appearance [Well Groomed] : well groomed [General Appearance - Well Nourished] : well nourished [No Deformities] : no deformities [General Appearance - In No Acute Distress] : no acute distress [Normal Conjunctiva] : the conjunctiva exhibited no abnormalities [Eyelids - No Xanthelasma] : the eyelids demonstrated no xanthelasmas [No Oral Pallor] : no oral pallor [Normal Oral Mucosa] : normal oral mucosa [No Oral Cyanosis] : no oral cyanosis [Normal Jugular Venous A Waves Present] : normal jugular venous A waves present [Normal Jugular Venous V Waves Present] : normal jugular venous V waves present [No Jugular Venous Bull A Waves] : no jugular venous bull A waves [Respiration, Rhythm And Depth] : normal respiratory rhythm and effort [Exaggerated Use Of Accessory Muscles For Inspiration] : no accessory muscle use [Auscultation Breath Sounds / Voice Sounds] : lungs were clear to auscultation bilaterally [Abdomen Soft] : soft [Abdomen Tenderness] : non-tender [Abdomen Mass (___ Cm)] : no abdominal mass palpated [Abnormal Walk] : normal gait [Gait - Sufficient For Exercise Testing] : the gait was sufficient for exercise testing [Nail Clubbing] : no clubbing of the fingernails [Cyanosis, Localized] : no localized cyanosis [Petechial Hemorrhages (___cm)] : no petechial hemorrhages [Skin Color & Pigmentation] : normal skin color and pigmentation [] : no rash [No Venous Stasis] : no venous stasis [Skin Lesions] : no skin lesions [No Skin Ulcers] : no skin ulcer [No Xanthoma] : no  xanthoma was observed [Oriented To Time, Place, And Person] : oriented to person, place, and time [Affect] : the affect was normal [Mood] : the mood was normal [No Anxiety] : not feeling anxious [5th Left ICS - MCL] : palpated at the 5th LICS in the midclavicular line [Normal] : normal [Normal Rate] : normal [Rhythm Regular] : regular [Normal S1] : normal S1 [Normal S2] : normal S2 [No Murmur] : no murmurs heard [No Abnormalities] : the abdominal aorta was not enlarged and no bruit was heard [Right Carotid Bruit] : no bruit heard over the right carotid [Left Carotid Bruit] : no bruit heard over the left carotid [Right Femoral Bruit] : no bruit heard over the right femoral artery [Left Femoral Bruit] : no bruit heard over the left femoral artery [Rt] : no varicose veins of the right leg [Lt] : no varicose veins of the left leg

## 2020-05-21 NOTE — HISTORY OF PRESENT ILLNESS
[FreeTextEntry1] :        The patient is a 49 year-old woman, daughter of MrMeg and Mrs. Camilo Ca, and who is well known to me from my previous practice at [facility]. She is under management of:\par        1) hyperlipidemia, on statin (Atorva)\par        2) long-standing asthma.\par        3) Treated Hypothyroidism, per Endo (Dr. Wolff) She is also using Synthroid chronically for hypothyroidism.\par        4) Headache syndrome, per neuro (Dr. FLAQUITA Silva)--seeing neuro about her recurrent and frequent headaches\par        5) Asthma remains under good control.  **Going to Dr. Ludin Dia for pulmonary, \par        Currently: has been taking and tolerating Lipitor without issue. However, her last cholesterol was 213 with an LDL of 135. I called her and she agreed to redouble her dietary efforts. Since that time, she has really not altered her eating habits, do to being quarantined for The Corona pandemic. I advised her that should her cholesterol return high next visit, we'll increase Lipitor to 20 mg per\par

## 2020-06-04 ENCOUNTER — LABORATORY RESULT (OUTPATIENT)
Age: 50
End: 2020-06-04

## 2020-06-10 ENCOUNTER — APPOINTMENT (OUTPATIENT)
Dept: ENDOCRINOLOGY | Facility: CLINIC | Age: 50
End: 2020-06-10
Payer: MEDICARE

## 2020-06-10 PROCEDURE — 99214 OFFICE O/P EST MOD 30 MIN: CPT | Mod: 95

## 2020-06-10 NOTE — HISTORY OF PRESENT ILLNESS
[Home] : at home, [unfilled] , at the time of the visit. [Verbal consent obtained from patient] : the patient, [unfilled] [Medical Office: ([facility])___] : at the medical office located in  [FreeTextEntry1] : Ms. ALY ARMAS is a 48 year old female\par coming for f/u hypothyroidism, osteoporosis  has also early menopause last period 2013\par last Prolia end of last year 9/2019, was due march 2020 was afraid of COVID so she skipped it \par  Prolia 7/30/2018\par 2/13/2019\par 9/4/2019\par \par  Dr Raz ONOFRE started her on Omeprazole 10mg bid \par        on Vit D 2,000 IU qd for at least a month\par        has osteoporosis \par        had not been taking her meds end of December, also January and February each time for a week at a time due to gastroenteritis\par        labs reviewed with the pt, has low IGF1\par        TFts normal\par        has dot-cisterna magna sees Dr Silva Neurology last CT scan and last MRI head reviewed\par        labs done 2/19/15 showed TSH 2.6, FT4 0.8, none since thyroid was changed\par        has been on Synthroid since she was 14yo, on Synthroid 88mcg NAMAN changed 9/2016\par        has Osteopenia DEXA scan done by her Gyn at [facility] referred at [facility] done 1/17 mildy decreased , T score -2 both hip and spine \par repeated DEXA 5/19 c/w osteoporosis T score -2.6 left femur, improved , forearm osteopenia -2.0\par        calcium 500+ Vit D qd takes it 4hr after Synthroid \par        iPTH 60, \par         takes Vit D 2,000 IU qd \par        no persistent Therapy with prednisone, has asthma \par        has been on Phentermine by PCP for 3 months lost 20lb , then 10lb more since she was sick\par        thyroid US reviewed showed small nodule 5mm nearby her thyroid, lymph node versus parathyroid adenoma, however iPTH 60 with low D, will repeat once D level is normal\par \par labs reviewed very good

## 2020-06-10 NOTE — REVIEW OF SYSTEMS
[Heartburn] : heartburn [Fatigue] : fatigue [Back Pain] : back pain [Joint Pain] : joint pain [Dry Skin] : dry skin [Headaches] : headaches [Depression] : depression [Anxiety] : anxiety [Negative] : Heme/Lymph [FreeTextEntry8] : last period 43 yo , has had irregular periods before

## 2020-06-15 ENCOUNTER — APPOINTMENT (OUTPATIENT)
Dept: ENDOCRINOLOGY | Facility: CLINIC | Age: 50
End: 2020-06-15

## 2020-06-23 ENCOUNTER — RESULT REVIEW (OUTPATIENT)
Age: 50
End: 2020-06-23

## 2020-07-02 ENCOUNTER — APPOINTMENT (OUTPATIENT)
Dept: CARDIOLOGY | Facility: CLINIC | Age: 50
End: 2020-07-02
Payer: MEDICARE

## 2020-07-02 ENCOUNTER — NON-APPOINTMENT (OUTPATIENT)
Age: 50
End: 2020-07-02

## 2020-07-02 VITALS
SYSTOLIC BLOOD PRESSURE: 132 MMHG | WEIGHT: 149 LBS | DIASTOLIC BLOOD PRESSURE: 80 MMHG | BODY MASS INDEX: 28.13 KG/M2 | HEIGHT: 61 IN

## 2020-07-02 PROCEDURE — 36415 COLL VENOUS BLD VENIPUNCTURE: CPT

## 2020-07-02 PROCEDURE — 99214 OFFICE O/P EST MOD 30 MIN: CPT

## 2020-07-02 PROCEDURE — 93000 ELECTROCARDIOGRAM COMPLETE: CPT

## 2020-07-02 NOTE — HISTORY OF PRESENT ILLNESS
[FreeTextEntry1] :        The patient is a 49 year-old woman, daughter of MrMeg and . Camilo Ca, and who is well known to me from my previous practice at [facility]. She is under management of:\par        1) hyperlipidemia, on statin (Atorva)\par        2) long-standing asthma.\par        3) Treated Hypothyroidism, per Endo (Dr. Wolff) She is also using Synthroid chronically for hypothyroidism.\par        4) Headache syndrome, per neuro (Dr. FLAQUITA Silva)--seeing neuro about her recurrent and frequent headaches\par        5) Asthma remains under good control.  **Going to Dr. Ludin Dia for pulmonary, \par        Currently: has been taking and tolerating Lipitor without issue. However, her last cholesterol was 213 with an LDL of 135. I called her and she agreed to redouble her dietary efforts. Since that time, she has really not altered her eating habits, do to being quarantined for The Corona pandemic. I advised her that should her cholesterol return high next visit, we'll increase Lipitor to 20 mg per\par \par EKG (7/2/20):Sinus  Rhythm \par nl axis\par WNL and NSC\par (Anterior T-wave changes persist; known normal varient)\par

## 2020-07-02 NOTE — PHYSICAL EXAM
[Normal Appearance] : normal appearance [General Appearance - Well Developed] : well developed [Well Groomed] : well groomed [General Appearance - Well Nourished] : well nourished [No Deformities] : no deformities [General Appearance - In No Acute Distress] : no acute distress [Normal Conjunctiva] : the conjunctiva exhibited no abnormalities [Eyelids - No Xanthelasma] : the eyelids demonstrated no xanthelasmas [Normal Oral Mucosa] : normal oral mucosa [No Oral Pallor] : no oral pallor [Normal Jugular Venous A Waves Present] : normal jugular venous A waves present [No Oral Cyanosis] : no oral cyanosis [Normal Jugular Venous V Waves Present] : normal jugular venous V waves present [No Jugular Venous Bull A Waves] : no jugular venous bull A waves [Respiration, Rhythm And Depth] : normal respiratory rhythm and effort [Abdomen Soft] : soft [Auscultation Breath Sounds / Voice Sounds] : lungs were clear to auscultation bilaterally [Exaggerated Use Of Accessory Muscles For Inspiration] : no accessory muscle use [Abdomen Tenderness] : non-tender [Abdomen Mass (___ Cm)] : no abdominal mass palpated [Abnormal Walk] : normal gait [Gait - Sufficient For Exercise Testing] : the gait was sufficient for exercise testing [Nail Clubbing] : no clubbing of the fingernails [Cyanosis, Localized] : no localized cyanosis [] : no rash [Skin Color & Pigmentation] : normal skin color and pigmentation [Petechial Hemorrhages (___cm)] : no petechial hemorrhages [Skin Lesions] : no skin lesions [No Venous Stasis] : no venous stasis [No Xanthoma] : no  xanthoma was observed [No Skin Ulcers] : no skin ulcer [Affect] : the affect was normal [Oriented To Time, Place, And Person] : oriented to person, place, and time [Mood] : the mood was normal [No Anxiety] : not feeling anxious [5th Left ICS - MCL] : palpated at the 5th LICS in the midclavicular line [Normal] : normal [Rhythm Regular] : regular [Normal Rate] : normal [Normal S1] : normal S1 [No Murmur] : no murmurs heard [Normal S2] : normal S2 [No Abnormalities] : the abdominal aorta was not enlarged and no bruit was heard [Right Carotid Bruit] : no bruit heard over the right carotid [Left Femoral Bruit] : no bruit heard over the left femoral artery [Left Carotid Bruit] : no bruit heard over the left carotid [Right Femoral Bruit] : no bruit heard over the right femoral artery [Rt] : no varicose veins of the right leg [Lt] : no varicose veins of the left leg

## 2020-07-03 LAB
ALBUMIN SERPL ELPH-MCNC: 4.6 G/DL
ALP BLD-CCNC: 91 U/L
ALT SERPL-CCNC: 17 U/L
ANION GAP SERPL CALC-SCNC: 12 MMOL/L
AST SERPL-CCNC: 16 U/L
BASOPHILS # BLD AUTO: 0.04 K/UL
BASOPHILS NFR BLD AUTO: 0.5 %
BILIRUB SERPL-MCNC: 0.2 MG/DL
BUN SERPL-MCNC: 15 MG/DL
CALCIUM SERPL-MCNC: 9.3 MG/DL
CHLORIDE SERPL-SCNC: 102 MMOL/L
CHOLEST SERPL-MCNC: 171 MG/DL
CHOLEST/HDLC SERPL: 2.9 RATIO
CO2 SERPL-SCNC: 28 MMOL/L
CREAT SERPL-MCNC: 0.85 MG/DL
EOSINOPHIL # BLD AUTO: 0.08 K/UL
EOSINOPHIL NFR BLD AUTO: 0.9 %
GLUCOSE SERPL-MCNC: 107 MG/DL
HCT VFR BLD CALC: 42.6 %
HDLC SERPL-MCNC: 59 MG/DL
HGB BLD-MCNC: 13.7 G/DL
IMM GRANULOCYTES NFR BLD AUTO: 0.5 %
LDLC SERPL CALC-MCNC: 90 MG/DL
LYMPHOCYTES # BLD AUTO: 3.8 K/UL
LYMPHOCYTES NFR BLD AUTO: 43.3 %
MAN DIFF?: NORMAL
MCHC RBC-ENTMCNC: 29.9 PG
MCHC RBC-ENTMCNC: 32.2 GM/DL
MCV RBC AUTO: 93 FL
MONOCYTES # BLD AUTO: 0.52 K/UL
MONOCYTES NFR BLD AUTO: 5.9 %
NEUTROPHILS # BLD AUTO: 4.29 K/UL
NEUTROPHILS NFR BLD AUTO: 48.9 %
PLATELET # BLD AUTO: 305 K/UL
POTASSIUM SERPL-SCNC: 5 MMOL/L
PROT SERPL-MCNC: 6.8 G/DL
RBC # BLD: 4.58 M/UL
RBC # FLD: 13.3 %
SODIUM SERPL-SCNC: 143 MMOL/L
TRIGL SERPL-MCNC: 112 MG/DL
WBC # FLD AUTO: 8.77 K/UL

## 2020-07-23 ENCOUNTER — APPOINTMENT (OUTPATIENT)
Dept: CARDIOLOGY | Facility: CLINIC | Age: 50
End: 2020-07-23

## 2020-10-01 ENCOUNTER — LABORATORY RESULT (OUTPATIENT)
Age: 50
End: 2020-10-01

## 2020-10-02 ENCOUNTER — APPOINTMENT (OUTPATIENT)
Dept: ENDOCRINOLOGY | Facility: CLINIC | Age: 50
End: 2020-10-02
Payer: MEDICARE

## 2020-10-02 ENCOUNTER — LABORATORY RESULT (OUTPATIENT)
Age: 50
End: 2020-10-02

## 2020-10-02 PROCEDURE — 36415 COLL VENOUS BLD VENIPUNCTURE: CPT

## 2020-10-07 ENCOUNTER — APPOINTMENT (OUTPATIENT)
Dept: ENDOCRINOLOGY | Facility: CLINIC | Age: 50
End: 2020-10-07
Payer: MEDICARE

## 2020-10-07 VITALS
DIASTOLIC BLOOD PRESSURE: 80 MMHG | HEART RATE: 77 BPM | OXYGEN SATURATION: 98 % | HEIGHT: 61 IN | BODY MASS INDEX: 29.64 KG/M2 | WEIGHT: 157 LBS | TEMPERATURE: 98.4 F | SYSTOLIC BLOOD PRESSURE: 124 MMHG

## 2020-10-07 LAB
24R-OH-CALCIDIOL SERPL-MCNC: 83.5 PG/ML
25(OH)D3 SERPL-MCNC: 41.7 NG/ML
ALBUMIN MFR SERPL ELPH: 58.9 %
ALBUMIN SERPL ELPH-MCNC: 4.8 G/DL
ALBUMIN SERPL-MCNC: 4.1 G/DL
ALBUMIN/GLOB SERPL: 1.4 RATIO
ALP BLD-CCNC: 106 U/L
ALPHA1 GLOB MFR SERPL ELPH: 4.7 %
ALPHA1 GLOB SERPL ELPH-MCNC: 0.3 G/DL
ALPHA2 GLOB MFR SERPL ELPH: 14.8 %
ALPHA2 GLOB SERPL ELPH-MCNC: 1 G/DL
ALT SERPL-CCNC: 14 U/L
ANION GAP SERPL CALC-SCNC: 14 MMOL/L
AST SERPL-CCNC: 20 U/L
B-GLOBULIN MFR SERPL ELPH: 11.4 %
B-GLOBULIN SERPL ELPH-MCNC: 0.8 G/DL
BILIRUB SERPL-MCNC: 0.2 MG/DL
BUN SERPL-MCNC: 15 MG/DL
CALCIUM SERPL-MCNC: 9.6 MG/DL
CALCIUM SERPL-MCNC: 9.6 MG/DL
CHLORIDE SERPL-SCNC: 104 MMOL/L
CO2 SERPL-SCNC: 27 MMOL/L
CREAT SERPL-MCNC: 0.93 MG/DL
GAMMA GLOB FLD ELPH-MCNC: 0.7 G/DL
GAMMA GLOB MFR SERPL ELPH: 10.2 %
GLUCOSE BLDC GLUCOMTR-MCNC: 99
GLUCOSE SERPL-MCNC: 102 MG/DL
INTERPRETATION SERPL IEP-IMP: NORMAL
MAGNESIUM SERPL-MCNC: 2.5 MG/DL
PARATHYROID HORMONE INTACT: 60 PG/ML
PHOSPHATE SERPL-MCNC: 3.2 MG/DL
POTASSIUM SERPL-SCNC: 4.7 MMOL/L
PROT SERPL-MCNC: 7 G/DL
PROT SERPL-MCNC: 7 G/DL
PROT SERPL-MCNC: 7.1 G/DL
SODIUM SERPL-SCNC: 144 MMOL/L
T4 FREE SERPL-MCNC: 1.4 NG/DL
TSH SERPL-ACNC: 1.03 UIU/ML
TSH SERPL-ACNC: 1.08 UIU/ML

## 2020-10-07 PROCEDURE — 82962 GLUCOSE BLOOD TEST: CPT

## 2020-10-07 PROCEDURE — 99214 OFFICE O/P EST MOD 30 MIN: CPT | Mod: 25

## 2020-10-07 RX ORDER — FAMOTIDINE 40 MG/1
40 TABLET, FILM COATED ORAL
Qty: 30 | Refills: 0 | Status: ACTIVE | COMMUNITY
Start: 2020-09-30

## 2020-10-07 NOTE — REVIEW OF SYSTEMS
[Fatigue] : fatigue [Recent Weight Gain (___ Lbs)] : recent weight gain: [unfilled] lbs [Heartburn] : heartburn [Joint Pain] : joint pain [Back Pain] : back pain [Dry Skin] : dry skin [Headaches] : headaches [Depression] : depression [Anxiety] : anxiety [Negative] : Heme/Lymph [FreeTextEntry8] : last period 43 yo , has had irregular periods before

## 2020-10-07 NOTE — HISTORY OF PRESENT ILLNESS
[FreeTextEntry1] : Ms. ALY ARMAS is a 49 year old female\par coming for f/u hypothyroidism, osteoporosis  has also early menopause last period 2013\par last Prolia end of last year 9/2019, was due march 2020 was afraid of COVID so she skipped it \par  Prolia 7/30/2018\par 2/13/2019\par 9/4/2019\par \par  Dr Raz ONOFRE started her on Omeprazole 10mg bid \par        on Vit D 2,000 IU qd for at least a month\par        has osteoporosis \par        had not been taking her meds end of December, also January and February each time for a week at a time due to gastroenteritis\par        labs reviewed with the pt, has low IGF1\par        TFts normal\par        has dot-cisterna magna sees Dr Silva Neurology last CT scan and last MRI head reviewed\par        labs done 2/19/15 showed TSH 2.6, FT4 0.8, none since thyroid was changed\par        has been on Synthroid since she was 16yo, on Synthroid 88mcg NAMAN changed 9/2016\par        has Osteopenia DEXA scan done by her Gyn at [facility] referred at [facility] done 1/17 mildy decreased , T score -2 both hip and spine \par repeated DEXA 5/19 c/w osteoporosis T score -2.6 left femur, improved , forearm osteopenia -2.0\par        calcium 500+ Vit D qd takes it 4hr after Synthroid \par        iPTH 60, \par         takes Vit D 2,000 IU qd \par        no persistent Therapy with prednisone, has asthma \par        has been on Phentermine by PCP for 3 months lost 20lb , then 10lb more since she was sick\par        thyroid US reviewed showed small nodule 5mm nearby her thyroid, lymph node versus parathyroid adenoma, however iPTH 60 with low D, will repeat once D level is normal\par \par labs reviewed very good

## 2020-10-09 LAB
ALBUMIN SERPL ELPH-MCNC: NORMAL
ALP BLD-CCNC: NORMAL
ALT SERPL-CCNC: NORMAL
ANION GAP SERPL CALC-SCNC: NORMAL MMOL/L
AST SERPL-CCNC: NORMAL
BILIRUB SERPL-MCNC: NORMAL
BUN SERPL-MCNC: NORMAL
CALCIUM SERPL-MCNC: NORMAL
CHLORIDE SERPL-SCNC: NORMAL
CO2 SERPL-SCNC: NORMAL
CREAT SERPL-MCNC: NORMAL
GLUCOSE SERPL-MCNC: NORMAL
POTASSIUM SERPL-SCNC: NORMAL
PROT SERPL-MCNC: NORMAL
SODIUM SERPL-SCNC: NORMAL

## 2020-11-04 ENCOUNTER — RX RENEWAL (OUTPATIENT)
Age: 50
End: 2020-11-04

## 2020-11-05 ENCOUNTER — APPOINTMENT (OUTPATIENT)
Dept: CARDIOLOGY | Facility: CLINIC | Age: 50
End: 2020-11-05
Payer: MEDICARE

## 2020-11-05 ENCOUNTER — NON-APPOINTMENT (OUTPATIENT)
Age: 50
End: 2020-11-05

## 2020-11-05 VITALS
BODY MASS INDEX: 29.64 KG/M2 | HEIGHT: 61 IN | DIASTOLIC BLOOD PRESSURE: 68 MMHG | WEIGHT: 157 LBS | SYSTOLIC BLOOD PRESSURE: 110 MMHG

## 2020-11-05 PROCEDURE — 99072 ADDL SUPL MATRL&STAF TM PHE: CPT

## 2020-11-05 PROCEDURE — 99214 OFFICE O/P EST MOD 30 MIN: CPT

## 2020-11-05 PROCEDURE — 36415 COLL VENOUS BLD VENIPUNCTURE: CPT

## 2020-11-05 PROCEDURE — 93000 ELECTROCARDIOGRAM COMPLETE: CPT

## 2020-11-05 NOTE — ASSESSMENT
[FreeTextEntry1] :  Assessment: \par 1. Mixed hyperlipidemia - E78.2 (Primary)  \par 2. Asthma - J45.909  \par 3. Hypothyroidism - E03.9  \par 4. Vitamin D deficiency - E55.9  \par 5. Headache - R51  \par 6. Primary insomnia - F51.01   \par \par Discussion:\par  1) Hyperlipidemia- on Lipitor. Last LDL 90 in July.\par 2) Hypothyroidism and osteopenia--per Endo (Dr. Wolff)\par 3) Asthma-stable\par 4) Insomnia-chronic X many, many years. Agree with melatonin. I have recommended an exercise van too. She gets no exercise. \par

## 2020-11-05 NOTE — HISTORY OF PRESENT ILLNESS
[FreeTextEntry1] :        The patient is a 49 year-old woman, daughter of MrMeg and . Camilo Ca, and who is well known to me from my previous practice at [facility]. She is under management of:\par        1) hyperlipidemia, on statin (Atorva)\par        2) long-standing asthma.\par        3) Treated Hypothyroidism, per Endo (Dr. Wolff) She is also using Synthroid chronically for hypothyroidism.\par        4) Headache syndrome, per neuro (Dr. FLAQUITA Silva)--seeing neuro about her recurrent and frequent headaches\par        5) Asthma remains under good control.  **Going to Dr. Ludin Dia for pulmonary, \par        Currently: has been taking and tolerating Lipitor without issue. However, her last cholesterol was 213 with an LDL of 135. I called her and she agreed to redouble her dietary efforts. Since that time, she has really not altered her eating habits, do to being quarantined for The Corona pandemic. I advised her that should her cholesterol return high next visit, we'll increase Lipitor to 20 mg per\par \par EKG (7/2/20):Sinus  Rhythm \par nl axis\par WNL and NSC\par (Anterior T-wave changes persist; known normal varient)\par

## 2020-11-06 LAB
ALBUMIN SERPL ELPH-MCNC: 4.5 G/DL
ALP BLD-CCNC: 80 U/L
ALT SERPL-CCNC: 15 U/L
ANION GAP SERPL CALC-SCNC: 10 MMOL/L
AST SERPL-CCNC: 20 U/L
BASOPHILS # BLD AUTO: 0.03 K/UL
BASOPHILS NFR BLD AUTO: 0.4 %
BILIRUB SERPL-MCNC: 0.3 MG/DL
BUN SERPL-MCNC: 17 MG/DL
CALCIUM SERPL-MCNC: 9.6 MG/DL
CHLORIDE SERPL-SCNC: 104 MMOL/L
CHOLEST SERPL-MCNC: 166 MG/DL
CO2 SERPL-SCNC: 29 MMOL/L
CREAT SERPL-MCNC: 0.89 MG/DL
EOSINOPHIL # BLD AUTO: 0.11 K/UL
EOSINOPHIL NFR BLD AUTO: 1.6 %
GLUCOSE SERPL-MCNC: 91 MG/DL
HCT VFR BLD CALC: 41.5 %
HDLC SERPL-MCNC: 58 MG/DL
HGB BLD-MCNC: 13.7 G/DL
IMM GRANULOCYTES NFR BLD AUTO: 0.1 %
LDLC SERPL CALC-MCNC: 89 MG/DL
LYMPHOCYTES # BLD AUTO: 3.17 K/UL
LYMPHOCYTES NFR BLD AUTO: 44.7 %
MAN DIFF?: NORMAL
MCHC RBC-ENTMCNC: 29.8 PG
MCHC RBC-ENTMCNC: 33 GM/DL
MCV RBC AUTO: 90.4 FL
MONOCYTES # BLD AUTO: 0.44 K/UL
MONOCYTES NFR BLD AUTO: 6.2 %
NEUTROPHILS # BLD AUTO: 3.33 K/UL
NEUTROPHILS NFR BLD AUTO: 47 %
NONHDLC SERPL-MCNC: 108 MG/DL
PLATELET # BLD AUTO: 259 K/UL
POTASSIUM SERPL-SCNC: 4.4 MMOL/L
PROT SERPL-MCNC: 6.6 G/DL
RBC # BLD: 4.59 M/UL
RBC # FLD: 13.1 %
SODIUM SERPL-SCNC: 143 MMOL/L
TRIGL SERPL-MCNC: 97 MG/DL
WBC # FLD AUTO: 7.09 K/UL

## 2021-03-05 ENCOUNTER — APPOINTMENT (OUTPATIENT)
Dept: ENDOCRINOLOGY | Facility: CLINIC | Age: 51
End: 2021-03-05
Payer: COMMERCIAL

## 2021-03-05 VITALS
HEART RATE: 70 BPM | SYSTOLIC BLOOD PRESSURE: 100 MMHG | DIASTOLIC BLOOD PRESSURE: 64 MMHG | HEIGHT: 61 IN | OXYGEN SATURATION: 98 % | TEMPERATURE: 98.5 F | BODY MASS INDEX: 27.94 KG/M2 | WEIGHT: 148 LBS

## 2021-03-05 PROCEDURE — 36415 COLL VENOUS BLD VENIPUNCTURE: CPT

## 2021-03-05 PROCEDURE — 99072 ADDL SUPL MATRL&STAF TM PHE: CPT

## 2021-03-05 PROCEDURE — 99214 OFFICE O/P EST MOD 30 MIN: CPT | Mod: 25

## 2021-03-05 RX ORDER — OMEPRAZOLE 20 MG/1
20 CAPSULE, DELAYED RELEASE ORAL
Refills: 0 | Status: DISCONTINUED | COMMUNITY
End: 2021-03-05

## 2021-03-05 NOTE — HISTORY OF PRESENT ILLNESS
[FreeTextEntry1] : Ms. ALY ARMAS is a 50 year old female\par coming for f/u hypothyroidism, osteoporosis  has also early menopause last period 2013\par last Prolia end of last year 9/2019, was due march 2020 was afraid of COVID so she skipped it , also skipped 12/2020\par  Prolia 7/30/2018\par 2/13/2019\par 9/4/2019\par 6/2020\par \par  Dr Raz ONOFRE started her on Omeprazole 10mg bid \par        on Vit D 2,000 IU qd for at least a month\par        has osteoporosis \par        had not been taking her meds end of December, also January and February each time for a week at a time due to gastroenteritis\par        labs reviewed with the pt, has low IGF1\par        TFts normal\par        has dot-cisterna magna sees Dr Silva Neurology last CT scan and last MRI head reviewed\par        labs done 2/19/15 showed TSH 2.6, FT4 0.8, none since thyroid was changed\par        has been on Synthroid since she was 16yo, on Synthroid 88mcg NAMAN changed 9/2016\par        has Osteopenia DEXA scan done by her Gyn at [facility] referred at [facility] done 1/17 mildy decreased , T score -2 both hip and spine \par repeated DEXA 5/19 c/w osteoporosis T score -2.6 left femur, improved , forearm osteopenia -2.0\par        calcium 500+ Vit D qd takes it 4hr after Synthroid \par        iPTH 60, \par         takes Vit D 2,000 IU qd \par        no persistent Therapy with prednisone, has asthma \par        has been on Phentermine by PCP for 3 months lost 20lb , then 10lb more since she was sick\par        thyroid US reviewed showed small nodule 5mm nearby her thyroid, lymph node versus parathyroid adenoma, however iPTH 60 with low D, will repeat once D level is normal\par \par labs reviewed very good

## 2021-03-05 NOTE — REVIEW OF SYSTEMS
[Fatigue] : fatigue [Recent Weight Gain (___ Lbs)] : recent weight gain: [unfilled] lbs [Heartburn] : heartburn [Joint Pain] : joint pain [Back Pain] : back pain [Dry Skin] : dry skin [Headaches] : headaches [Depression] : depression [Anxiety] : anxiety [Negative] : Heme/Lymph [FreeTextEntry8] : last period 41 yo , has had irregular periods before

## 2021-03-18 ENCOUNTER — NON-APPOINTMENT (OUTPATIENT)
Age: 51
End: 2021-03-18

## 2021-03-18 ENCOUNTER — APPOINTMENT (OUTPATIENT)
Dept: CARDIOLOGY | Facility: CLINIC | Age: 51
End: 2021-03-18
Payer: MEDICARE

## 2021-03-18 VITALS
SYSTOLIC BLOOD PRESSURE: 112 MMHG | BODY MASS INDEX: 27.94 KG/M2 | HEIGHT: 61 IN | WEIGHT: 148 LBS | DIASTOLIC BLOOD PRESSURE: 64 MMHG

## 2021-03-18 PROCEDURE — 93000 ELECTROCARDIOGRAM COMPLETE: CPT

## 2021-03-18 PROCEDURE — 99072 ADDL SUPL MATRL&STAF TM PHE: CPT

## 2021-03-18 PROCEDURE — 99213 OFFICE O/P EST LOW 20 MIN: CPT

## 2021-03-18 NOTE — HISTORY OF PRESENT ILLNESS
[FreeTextEntry1] :        The patient is a 49 year-old woman, daughter of MrMeg and . Camilo Ca, and who is well known to me from my previous practice at [facility]. She is under management of:\par        1) hyperlipidemia, on statin (Atorva)\par        2) long-standing asthma.\par        3) Treated Hypothyroidism, per Endo (Dr. Wolff) She is also using Synthroid chronically for hypothyroidism.\par        4) Headache syndrome, per neuro (Dr. FLAQUITA Silva)--seeing neuro about her recurrent and frequent headaches\par        5) Asthma remains under good control.  **Going to Dr. Ludin Dia for pulmonary, \par        Currently: has been taking and tolerating Lipitor without issue. However, her last cholesterol was 213 with an LDL of 135. I called her and she agreed to redouble her dietary efforts. Since that time, she has really not altered her eating habits, do to being quarantined for The Corona pandemic. I advised her that should her cholesterol return high next visit, we'll increase Lipitor to 20 mg per\par \par EKG (7/2/20):Sinus  Rhythm \par nl axis\par WNL and NSC\par (Anterior T-wave changes persist; known normal varient)\par

## 2021-04-14 LAB
24R-OH-CALCIDIOL SERPL-MCNC: 45.9 PG/ML
25(OH)D3 SERPL-MCNC: 49.5 NG/ML
ALBUMIN SERPL ELPH-MCNC: 4.2 G/DL
ALP BLD-CCNC: 105 U/L
ALT SERPL-CCNC: 17 U/L
ANION GAP SERPL CALC-SCNC: 11 MMOL/L
AST SERPL-CCNC: 17 U/L
BILIRUB SERPL-MCNC: 0.3 MG/DL
BUN SERPL-MCNC: 18 MG/DL
CALCIUM SERPL-MCNC: 9.7 MG/DL
CALCIUM SERPL-MCNC: 9.7 MG/DL
CHLORIDE SERPL-SCNC: 100 MMOL/L
CHOLEST SERPL-MCNC: 148 MG/DL
CO2 SERPL-SCNC: 29 MMOL/L
CREAT SERPL-MCNC: 0.92 MG/DL
GLUCOSE SERPL-MCNC: 94 MG/DL
HDLC SERPL-MCNC: 49 MG/DL
LDLC SERPL CALC-MCNC: 81 MG/DL
MAGNESIUM SERPL-MCNC: 2 MG/DL
NONHDLC SERPL-MCNC: 99 MG/DL
PARATHYROID HORMONE INTACT: 29 PG/ML
PHOSPHATE SERPL-MCNC: 4.6 MG/DL
POTASSIUM SERPL-SCNC: 4.2 MMOL/L
PROT SERPL-MCNC: 6.7 G/DL
SODIUM SERPL-SCNC: 140 MMOL/L
T4 FREE SERPL-MCNC: 1.6 NG/DL
TRIGL SERPL-MCNC: 89 MG/DL
TSH SERPL-ACNC: 0.81 UIU/ML

## 2021-06-30 ENCOUNTER — APPOINTMENT (OUTPATIENT)
Dept: INTERNAL MEDICINE | Facility: CLINIC | Age: 51
End: 2021-06-30

## 2021-07-07 ENCOUNTER — APPOINTMENT (OUTPATIENT)
Dept: ENDOCRINOLOGY | Facility: CLINIC | Age: 51
End: 2021-07-07

## 2021-08-05 ENCOUNTER — APPOINTMENT (OUTPATIENT)
Dept: CARDIOLOGY | Facility: CLINIC | Age: 51
End: 2021-08-05
Payer: MEDICARE

## 2021-08-05 ENCOUNTER — NON-APPOINTMENT (OUTPATIENT)
Age: 51
End: 2021-08-05

## 2021-08-05 VITALS
HEIGHT: 61 IN | DIASTOLIC BLOOD PRESSURE: 60 MMHG | SYSTOLIC BLOOD PRESSURE: 110 MMHG | BODY MASS INDEX: 28.32 KG/M2 | WEIGHT: 150 LBS

## 2021-08-05 PROCEDURE — 93000 ELECTROCARDIOGRAM COMPLETE: CPT

## 2021-08-05 PROCEDURE — 99214 OFFICE O/P EST MOD 30 MIN: CPT

## 2021-08-05 PROCEDURE — 36415 COLL VENOUS BLD VENIPUNCTURE: CPT

## 2021-08-05 RX ORDER — MOMETASONE FUROATE 220 UG/1
220 INHALANT RESPIRATORY (INHALATION)
Refills: 0 | Status: DISCONTINUED | COMMUNITY
End: 2021-08-05

## 2021-08-05 RX ORDER — OXYCODONE AND ACETAMINOPHEN 5; 325 MG/1; MG/1
5-325 TABLET ORAL
Qty: 5 | Refills: 0 | Status: DISCONTINUED | COMMUNITY
Start: 2020-09-14 | End: 2021-08-05

## 2021-08-05 RX ORDER — LEVALBUTEROL TARTRATE 45 UG/1
AEROSOL, METERED ORAL
Refills: 0 | Status: DISCONTINUED | COMMUNITY
End: 2021-08-05

## 2021-08-05 RX ORDER — MOMETASONE FUROATE 110 UG/1
110 INHALANT RESPIRATORY (INHALATION)
Refills: 0 | Status: DISCONTINUED | COMMUNITY
End: 2021-08-05

## 2021-08-05 RX ORDER — DENOSUMAB 60 MG/ML
60 INJECTION SUBCUTANEOUS
Refills: 0 | Status: DISCONTINUED | COMMUNITY
End: 2021-08-05

## 2021-08-05 NOTE — ASSESSMENT
[FreeTextEntry1] :  Assessment: \par 1. Mixed hyperlipidemia - E78.2 (Primary)  \par 2. Asthma - J45.909  \par 3. Hypothyroidism - E03.9  \par 4. Vitamin D deficiency - E55.9  \par 5. Headache - R51  \par 6. Primary insomnia - F51.01   \par \par Discussion:\par  1) Hyperlipidemia- on Lipitor. Last LDL 81 in March\par 2) Hypothyroidism and osteopenia--per Endo (Dr. Wolff)\par 3) Asthma-stable\par 4) Insomnia-chronic X many, many years. Agree with melatonin. I have recommended an exercise program too. She gets no exercise. \par

## 2021-08-05 NOTE — HISTORY OF PRESENT ILLNESS
[FreeTextEntry1] :        The patient is a 50 year-old woman, daughter of MrMeg and . Camilo Ca, and who is well known to me from my previous practice at [facility]. She is under management of:\par        1) hyperlipidemia, on statin (Atorva)\par        2) long-standing asthma.\par        3) Treated Hypothyroidism, per Endo (Dr. Wolff) She is also using Synthroid chronically for hypothyroidism.\par        4) Headache syndrome, per neuro (Dr. FLAQUITA Silva)--seeing neuro about her recurrent and frequent headaches\par        5) Asthma remains under good control.  **Going to Dr. Ludin Dia for pulmonary, \par        Currently: has been taking and tolerating Lipitor without issue. However, her last cholesterol was 213 with an LDL of 135. I called her and she agreed to redouble her dietary efforts. Since that time, she has really not altered her eating habits, do to being quarantined for The Corona pandemic. I advised her that should her cholesterol return high next visit, we'll increase Lipitor to 20 mg per\par \par EKG (8/05/2021):\par Sinus  Rhythm \par Nl axis and intervals\par ANterior t-wave changes persist, as before.\par Likely, normal variant

## 2021-08-06 LAB
ALBUMIN SERPL ELPH-MCNC: 4.5 G/DL
ALP BLD-CCNC: 83 U/L
ALT SERPL-CCNC: 17 U/L
ANION GAP SERPL CALC-SCNC: 12 MMOL/L
AST SERPL-CCNC: 21 U/L
BASOPHILS # BLD AUTO: 0.02 K/UL
BASOPHILS NFR BLD AUTO: 0.3 %
BILIRUB SERPL-MCNC: 0.2 MG/DL
BUN SERPL-MCNC: 19 MG/DL
CALCIUM SERPL-MCNC: 9.3 MG/DL
CHLORIDE SERPL-SCNC: 102 MMOL/L
CHOLEST SERPL-MCNC: 198 MG/DL
CO2 SERPL-SCNC: 24 MMOL/L
CREAT SERPL-MCNC: 0.85 MG/DL
EOSINOPHIL # BLD AUTO: 0.09 K/UL
EOSINOPHIL NFR BLD AUTO: 1.3 %
GLUCOSE SERPL-MCNC: 98 MG/DL
HCT VFR BLD CALC: 40.1 %
HDLC SERPL-MCNC: 52 MG/DL
HGB BLD-MCNC: 13.5 G/DL
IMM GRANULOCYTES NFR BLD AUTO: 0.1 %
LDLC SERPL CALC-MCNC: 116 MG/DL
LYMPHOCYTES # BLD AUTO: 3.05 K/UL
LYMPHOCYTES NFR BLD AUTO: 44.7 %
MAN DIFF?: NORMAL
MCHC RBC-ENTMCNC: 30 PG
MCHC RBC-ENTMCNC: 33.7 GM/DL
MCV RBC AUTO: 89.1 FL
MONOCYTES # BLD AUTO: 0.42 K/UL
MONOCYTES NFR BLD AUTO: 6.2 %
NEUTROPHILS # BLD AUTO: 3.23 K/UL
NEUTROPHILS NFR BLD AUTO: 47.4 %
NONHDLC SERPL-MCNC: 146 MG/DL
PLATELET # BLD AUTO: 244 K/UL
POTASSIUM SERPL-SCNC: 5.1 MMOL/L
PROT SERPL-MCNC: 6.9 G/DL
RBC # BLD: 4.5 M/UL
RBC # FLD: 13.3 %
SODIUM SERPL-SCNC: 139 MMOL/L
TRIGL SERPL-MCNC: 151 MG/DL
WBC # FLD AUTO: 6.82 K/UL

## 2021-08-11 ENCOUNTER — APPOINTMENT (OUTPATIENT)
Dept: ENDOCRINOLOGY | Facility: CLINIC | Age: 51
End: 2021-08-11
Payer: MEDICARE

## 2021-08-11 VITALS
WEIGHT: 150 LBS | SYSTOLIC BLOOD PRESSURE: 110 MMHG | BODY MASS INDEX: 28.32 KG/M2 | HEART RATE: 78 BPM | HEIGHT: 61 IN | OXYGEN SATURATION: 98 % | RESPIRATION RATE: 17 BRPM | DIASTOLIC BLOOD PRESSURE: 70 MMHG

## 2021-08-11 LAB
CALCIUM SERPL-MCNC: 9.3 MG/DL
MAGNESIUM SERPL-MCNC: 2.1 MG/DL
PARATHYROID HORMONE INTACT: 96 PG/ML
PHOSPHATE SERPL-MCNC: 3 MG/DL
T4 FREE SERPL-MCNC: 1.4 NG/DL
TSH SERPL-ACNC: 0.5 UIU/ML

## 2021-08-11 PROCEDURE — 99215 OFFICE O/P EST HI 40 MIN: CPT

## 2021-08-11 NOTE — HISTORY OF PRESENT ILLNESS
[FreeTextEntry1] : Ms. ALY ARMAS is a 50 year old female\par coming for f/u hypothyroidism, osteoporosis  has also early menopause last period 2013\par keeps getting the inhaler, her asthma was getting worse lately per mother \par last Prolia end of last year 9/2019, was due march 2020 was afraid of COVID so she skipped it , also skipped 12/2020\par  Prolia 7/30/2018\par 2/13/2019\par 9/4/2019\par 6/2020\par 3/2021\par \par  Dr Raz ONOFRE started her on Omeprazole 10mg bid \par        on Vit D 2,000 IU qd for at least a month\par        has osteoporosis \par        had not been taking her meds end of December, also January and February each time for a week at a time due to gastroenteritis\par        labs reviewed with the pt, has low IGF1\par        TFts normal\par        has dot-cisterna magna sees Dr Silav Neurology last CT scan and last MRI head reviewed\par        labs done 2/19/15 showed TSH 2.6, FT4 0.8, none since thyroid was changed\par        has been on Synthroid since she was 14yo, on Synthroid 88mcg NAMAN changed 9/2016\par        has Osteopenia DEXA scan done by her Gyn at [facility] referred at [facility] done 1/17 mildy decreased , T score -2 both hip and spine \par repeated DEXA 5/19 c/w osteoporosis T score -2.6 left femur, improved , forearm osteopenia -2.0\par        calcium 500+ Vit D qd takes it 4hr after Synthroid \par        iPTH 60, \par         takes Vit D 2,000 IU qd \par        no persistent Therapy with prednisone, has asthma \par        has been on Phentermine by PCP for 3 months lost 20lb , then 10lb more since she was sick\par        thyroid US reviewed showed small nodule 5mm nearby her thyroid, lymph node versus parathyroid adenoma, however iPTH 60 with low D, will repeat once D level is normal\par \par labs reviewed very good 
1 pair

## 2021-09-02 ENCOUNTER — LABORATORY RESULT (OUTPATIENT)
Age: 51
End: 2021-09-02

## 2021-09-03 ENCOUNTER — APPOINTMENT (OUTPATIENT)
Dept: ENDOCRINOLOGY | Facility: CLINIC | Age: 51
End: 2021-09-03
Payer: MEDICARE

## 2021-09-03 PROCEDURE — 36415 COLL VENOUS BLD VENIPUNCTURE: CPT

## 2021-09-10 ENCOUNTER — APPOINTMENT (OUTPATIENT)
Dept: ENDOCRINOLOGY | Facility: CLINIC | Age: 51
End: 2021-09-10
Payer: MEDICARE

## 2021-09-10 PROCEDURE — 99215 OFFICE O/P EST HI 40 MIN: CPT | Mod: 95

## 2021-09-10 RX ORDER — CRANBERRY FRUIT EXTRACT 650 MG
100 MCG CAPSULE ORAL
Refills: 0 | Status: ACTIVE | COMMUNITY

## 2021-09-23 NOTE — HISTORY OF PRESENT ILLNESS
[Home] : at home, [unfilled] , at the time of the visit. [Medical Office: ([facility])___] : at the medical office located in  [Verbal consent obtained from patient] : the patient, [unfilled] [FreeTextEntry1] : Ms. ALY ARMAS is a 50 year old female\par coming for f/u hypothyroidism, osteoporosis  has also early menopause last period 2013\par keeps getting the inhaler, her asthma was getting worse lately per mother \par last Prolia end of last year 9/2019, was due march 2020 was afraid of COVID so she skipped it , also skipped 12/2020\par  Prolia 7/30/2018\par 2/13/2019\par 9/4/2019\par 6/2020\par 3/2021\par \par  Dr Raz ONOFRE started her on Omeprazole 10mg bid \par        on Vit D 2,000 IU qd for at least a month\par        has osteoporosis \par        had not been taking her meds end of December, also January and February each time for a week at a time due to gastroenteritis\par        labs reviewed with the pt, has low IGF1\par        TFts normal\par        has dot-cisterna magna sees Dr Silva Neurology last CT scan and last MRI head reviewed\par        labs done 2/19/15 showed TSH 2.6, FT4 0.8, none since thyroid was changed\par        has been on Synthroid since she was 16yo, on Synthroid 88mcg NAMAN changed 9/2016\par        has Osteopenia DEXA scan done by her Gyn at [facility] referred at [facility] done 1/17 mildy decreased , T score -2 both hip and spine \par repeated DEXA 5/19 c/w osteoporosis T score -2.6 left femur, improved , forearm osteopenia -2.0\par        calcium 500+ Vit D qd takes it 4hr after Synthroid \par        iPTH 60, \par         takes Vit D 2,000 IU qd \par        no persistent Therapy with prednisone, has asthma \par        has been on Phentermine by PCP for 3 months lost 20lb , then 10lb more since she was sick\par        thyroid US reviewed showed small nodule 5mm nearby her thyroid, lymph node versus parathyroid adenoma, however iPTH 60 with low D, will repeat once D level is normal\par \par labs reviewed very good

## 2021-09-23 NOTE — HISTORY OF PRESENT ILLNESS
[Home] : at home, [unfilled] , at the time of the visit. [Medical Office: ([facility])___] : at the medical office located in  [Verbal consent obtained from patient] : the patient, [unfilled] [FreeTextEntry1] : Ms. ALY ARMAS is a 50 year old female\par coming for f/u hypothyroidism, osteoporosis  has also early menopause last period 2013\par keeps getting the inhaler, her asthma was getting worse lately per mother \par last Prolia end of last year 9/2019, was due march 2020 was afraid of COVID so she skipped it , also skipped 12/2020\par  Prolia 7/30/2018\par 2/13/2019\par 9/4/2019\par 6/2020\par 3/2021\par \par  Dr aRz ONOFRE started her on Omeprazole 10mg bid \par        on Vit D 2,000 IU qd for at least a month\par        has osteoporosis \par        had not been taking her meds end of December, also January and February each time for a week at a time due to gastroenteritis\par        labs reviewed with the pt, has low IGF1\par        TFts normal\par        has dot-cisterna magna sees Dr Silva Neurology last CT scan and last MRI head reviewed\par        labs done 2/19/15 showed TSH 2.6, FT4 0.8, none since thyroid was changed\par        has been on Synthroid since she was 16yo, on Synthroid 88mcg NAMAN changed 9/2016\par        has Osteopenia DEXA scan done by her Gyn at [facility] referred at [facility] done 1/17 mildy decreased , T score -2 both hip and spine \par repeated DEXA 5/19 c/w osteoporosis T score -2.6 left femur, improved , forearm osteopenia -2.0\par        calcium 500+ Vit D qd takes it 4hr after Synthroid \par        iPTH 60, \par         takes Vit D 2,000 IU qd \par        no persistent Therapy with prednisone, has asthma \par        has been on Phentermine by PCP for 3 months lost 20lb , then 10lb more since she was sick\par        thyroid US reviewed showed small nodule 5mm nearby her thyroid, lymph node versus parathyroid adenoma, however iPTH 60 with low D, will repeat once D level is normal\par \par labs reviewed very good

## 2021-12-16 ENCOUNTER — APPOINTMENT (OUTPATIENT)
Dept: CARDIOLOGY | Facility: CLINIC | Age: 51
End: 2021-12-16
Payer: MEDICARE

## 2021-12-16 ENCOUNTER — NON-APPOINTMENT (OUTPATIENT)
Age: 51
End: 2021-12-16

## 2021-12-16 VITALS
HEIGHT: 61 IN | SYSTOLIC BLOOD PRESSURE: 100 MMHG | BODY MASS INDEX: 28.32 KG/M2 | WEIGHT: 150 LBS | DIASTOLIC BLOOD PRESSURE: 60 MMHG

## 2021-12-16 PROCEDURE — 36415 COLL VENOUS BLD VENIPUNCTURE: CPT

## 2021-12-16 PROCEDURE — 99214 OFFICE O/P EST MOD 30 MIN: CPT

## 2021-12-16 PROCEDURE — 93000 ELECTROCARDIOGRAM COMPLETE: CPT

## 2021-12-16 NOTE — HISTORY OF PRESENT ILLNESS
[FreeTextEntry1] :        The patient is a 51 year-old woman, daughter of MrMeg and Mrs. Camilo Ca, and who is well known to me from my previous practice at [facility]. She is under management of:\par        1) hyperlipidemia, on statin (Atorva)\par        2) long-standing asthma.\par        3) Treated Hypothyroidism, per Endo (Dr. Wolff) She is also using Synthroid chronically for hypothyroidism.\par        4) Headache syndrome, per neuro (Dr. FLAQUITA Silva)--seeing neuro about her recurrent and frequent headaches\par        5) Asthma remains under good control.  **Going to Dr. Ludin Dia for pulmonary, \par \par \par        Currently: She is feeling well. No symptoms. No chest pains and no dyspnea. Asthma is stable. \par Seeing her other consultants regularly\par She has been taking and tolerating Lipitor without issue.  Her last cholesterol was 198 with an LDL of 116. I called her and she agreed to redouble her dietary efforts. She does not want to increase the dosage of atorvastatin.\par \par EKG (12/16/2021):\par Sinus  Rhythm \par Nl axis and intervals\par Anterior t-wave changes persist, as before.\par Likely, normal variant

## 2021-12-17 LAB
ALBUMIN SERPL ELPH-MCNC: 4.5 G/DL
ALP BLD-CCNC: 86 U/L
ALT SERPL-CCNC: 31 U/L
ANION GAP SERPL CALC-SCNC: 13 MMOL/L
AST SERPL-CCNC: 24 U/L
BASOPHILS # BLD AUTO: 0.02 K/UL
BASOPHILS NFR BLD AUTO: 0.3 %
BILIRUB SERPL-MCNC: 0.3 MG/DL
BUN SERPL-MCNC: 16 MG/DL
CALCIUM SERPL-MCNC: 9.5 MG/DL
CHLORIDE SERPL-SCNC: 101 MMOL/L
CHOLEST SERPL-MCNC: 172 MG/DL
CO2 SERPL-SCNC: 24 MMOL/L
CREAT SERPL-MCNC: 0.89 MG/DL
EOSINOPHIL # BLD AUTO: 0.05 K/UL
EOSINOPHIL NFR BLD AUTO: 0.8 %
GLUCOSE SERPL-MCNC: 103 MG/DL
HCT VFR BLD CALC: 40.9 %
HDLC SERPL-MCNC: 46 MG/DL
HGB BLD-MCNC: 13.1 G/DL
IMM GRANULOCYTES NFR BLD AUTO: 0.2 %
LDLC SERPL CALC-MCNC: 95 MG/DL
LYMPHOCYTES # BLD AUTO: 3.05 K/UL
LYMPHOCYTES NFR BLD AUTO: 46.1 %
MAN DIFF?: NORMAL
MCHC RBC-ENTMCNC: 29 PG
MCHC RBC-ENTMCNC: 32 GM/DL
MCV RBC AUTO: 90.5 FL
MONOCYTES # BLD AUTO: 0.47 K/UL
MONOCYTES NFR BLD AUTO: 7.1 %
NEUTROPHILS # BLD AUTO: 3.02 K/UL
NEUTROPHILS NFR BLD AUTO: 45.5 %
NONHDLC SERPL-MCNC: 125 MG/DL
PLATELET # BLD AUTO: 253 K/UL
POTASSIUM SERPL-SCNC: 4.2 MMOL/L
PROT SERPL-MCNC: 6.9 G/DL
RBC # BLD: 4.52 M/UL
RBC # FLD: 13.1 %
SODIUM SERPL-SCNC: 137 MMOL/L
TRIGL SERPL-MCNC: 152 MG/DL
WBC # FLD AUTO: 6.62 K/UL

## 2022-02-23 ENCOUNTER — RX RENEWAL (OUTPATIENT)
Age: 52
End: 2022-02-23

## 2022-03-16 ENCOUNTER — APPOINTMENT (OUTPATIENT)
Dept: ENDOCRINOLOGY | Facility: CLINIC | Age: 52
End: 2022-03-16
Payer: MEDICARE

## 2022-03-16 PROCEDURE — 36415 COLL VENOUS BLD VENIPUNCTURE: CPT

## 2022-03-30 ENCOUNTER — APPOINTMENT (OUTPATIENT)
Dept: ENDOCRINOLOGY | Facility: CLINIC | Age: 52
End: 2022-03-30
Payer: MEDICARE

## 2022-03-30 VITALS — HEIGHT: 61 IN | BODY MASS INDEX: 28.13 KG/M2 | WEIGHT: 149 LBS

## 2022-03-30 DIAGNOSIS — E28.39 OTHER PRIMARY OVARIAN FAILURE: ICD-10-CM

## 2022-03-30 LAB
ALPHA SUBUNIT SERPL-MCNC: 0.52 NG/ML
CORTIS SERPL-MCNC: 4.6 UG/DL
ESTRADIOL SERPL-MCNC: <5 PG/ML
FSH SERPL-MCNC: 22 IU/L
GH SERPL-MCNC: 0.55 NG/ML
IGF-1 INTERP: NORMAL
IGF-I BLD-MCNC: 36 NG/ML
LH SERPL-ACNC: 12.2 IU/L
OSMOLALITY SERPL: 289 MOSM/KG
PROLACTIN SERPL-MCNC: 11.9 NG/ML
T4 FREE SERPL-MCNC: 1.5 NG/DL
TSH SERPL-ACNC: 1.1 UIU/ML

## 2022-03-30 PROCEDURE — 99215 OFFICE O/P EST HI 40 MIN: CPT | Mod: 95

## 2022-03-30 NOTE — HISTORY OF PRESENT ILLNESS
[Home] : at home, [unfilled] , at the time of the visit. [Verbal consent obtained from patient] : the patient, [unfilled] [FreeTextEntry1] : Ms. ALY ARMAS is a 51 year old female\par coming for f/u hypothyroidism, osteoporosis  has also early menopause last period 2013\par keeps getting the inhaler, her asthma was getting worse lately per mother \par last Prolia end of last year 9/2019, was due march 2020 was afraid of COVID so she skipped it , also skipped 12/2020\par  Prolia 7/30/2018\par 2/13/2019\par 9/4/2019\par 6/2020\par 3/2021\par \par  Dr Raz ONOFRE started her on Omeprazole 10mg bid \par        on Vit D 2,000 IU qd for at least a month\par        has osteoporosis \par        had not been taking her meds end of December, also January and February each time for a week at a time due to gastroenteritis\par        labs reviewed with the pt, has low IGF1\par        TFts normal\par        has dot-cisterna magna sees Dr Silva Neurology last CT scan and last MRI head reviewed\par        labs done 2/19/15 showed TSH 2.6, FT4 0.8, none since thyroid was changed\par        has been on Synthroid since she was 16yo, on Synthroid 88mcg NAMAN changed 9/2016\par        has Osteopenia DEXA scan done by her Gyn at [facility] referred at [facility] done 1/17 mildy decreased , T score -2 both hip and spine \par repeated DEXA 5/19 c/w osteoporosis T score -2.6 left femur, improved , forearm osteopenia -2.0\par        calcium 500+ Vit D qd takes it 4hr after Synthroid \par        iPTH 60, \par         takes Vit D 2,000 IU qd \par        no persistent Therapy with prednisone, has asthma \par        has been on Phentermine by PCP for 3 months lost 20lb , then 10lb more since she was sick\par        thyroid US reviewed showed small nodule 5mm nearby her thyroid, lymph node versus parathyroid adenoma, however iPTH 60 with low D, will repeat once D level is normal\par \par labs reviewed very good  [Medical Office: ([facility])___] : at the medical office located in

## 2022-04-14 ENCOUNTER — RESULT REVIEW (OUTPATIENT)
Age: 52
End: 2022-04-14

## 2022-04-14 PROBLEM — I10 HYPERTENSION: Status: ACTIVE | Noted: 2019-08-29

## 2022-04-21 ENCOUNTER — APPOINTMENT (OUTPATIENT)
Dept: CARDIOLOGY | Facility: CLINIC | Age: 52
End: 2022-04-21
Payer: MEDICARE

## 2022-04-21 ENCOUNTER — NON-APPOINTMENT (OUTPATIENT)
Age: 52
End: 2022-04-21

## 2022-04-21 VITALS
WEIGHT: 147 LBS | DIASTOLIC BLOOD PRESSURE: 60 MMHG | SYSTOLIC BLOOD PRESSURE: 110 MMHG | BODY MASS INDEX: 27.75 KG/M2 | HEIGHT: 61 IN

## 2022-04-21 DIAGNOSIS — I10 ESSENTIAL (PRIMARY) HYPERTENSION: ICD-10-CM

## 2022-04-21 PROCEDURE — 93000 ELECTROCARDIOGRAM COMPLETE: CPT

## 2022-04-21 PROCEDURE — 99214 OFFICE O/P EST MOD 30 MIN: CPT

## 2022-04-21 PROCEDURE — 36415 COLL VENOUS BLD VENIPUNCTURE: CPT

## 2022-04-21 NOTE — HISTORY OF PRESENT ILLNESS
[FreeTextEntry1] :        The patient is a 51 year-old woman, daughter of MrMeg and Mrs. Camilo Ca, and who is well known to me from my previous practice at [facility]. She is under management of:\par        1) hyperlipidemia, on statin (Atorva)\par        2) long-standing asthma.\par        3) Treated Hypothyroidism, per Endo (Dr. Wolff) She is also using Synthroid chronically for hypothyroidism.\par        4) Headache syndrome, per neuro (Dr. FLAQUITA Silva)--seeing neuro about her recurrent and frequent headaches\par        5) Asthma remains under good control.  **Going to Dr. Ludin Dia for pulmonary, \par \par \par        Currently: She is feeling well. No symptoms. No chest pains and no dyspnea. Asthma is stable. \par Seeing her other consultants regularly\par She has been taking and tolerating Lipitor without issue.  Her last cholesterol was 198 with an LDL of 116. I called her and she agreed to redouble her dietary efforts. She does not want to increase the dosage of atorvastatin.\par \par EKG (12/16/2021):\par Sinus  Rhythm \par Nl axis and intervals\par Anterior t-wave changes persist, as before.\par Likely, normal variant

## 2022-04-22 LAB
ALBUMIN SERPL ELPH-MCNC: 4.5 G/DL
ALP BLD-CCNC: 89 U/L
ALT SERPL-CCNC: 18 U/L
ANION GAP SERPL CALC-SCNC: 13 MMOL/L
AST SERPL-CCNC: 21 U/L
BASOPHILS # BLD AUTO: 0.04 K/UL
BASOPHILS NFR BLD AUTO: 0.6 %
BILIRUB SERPL-MCNC: 0.4 MG/DL
BUN SERPL-MCNC: 15 MG/DL
CALCIUM SERPL-MCNC: 9.9 MG/DL
CHLORIDE SERPL-SCNC: 104 MMOL/L
CHOLEST SERPL-MCNC: 182 MG/DL
CO2 SERPL-SCNC: 25 MMOL/L
CREAT SERPL-MCNC: 0.87 MG/DL
EGFR: 80 ML/MIN/1.73M2
EOSINOPHIL # BLD AUTO: 0.08 K/UL
EOSINOPHIL NFR BLD AUTO: 1.3 %
GLUCOSE SERPL-MCNC: 89 MG/DL
HCT VFR BLD CALC: 43.3 %
HDLC SERPL-MCNC: 48 MG/DL
HGB BLD-MCNC: 13.7 G/DL
IMM GRANULOCYTES NFR BLD AUTO: 0.2 %
LDLC SERPL CALC-MCNC: 108 MG/DL
LYMPHOCYTES # BLD AUTO: 2.88 K/UL
LYMPHOCYTES NFR BLD AUTO: 46.4 %
MAN DIFF?: NORMAL
MCHC RBC-ENTMCNC: 29.1 PG
MCHC RBC-ENTMCNC: 31.6 GM/DL
MCV RBC AUTO: 91.9 FL
MONOCYTES # BLD AUTO: 0.33 K/UL
MONOCYTES NFR BLD AUTO: 5.3 %
NEUTROPHILS # BLD AUTO: 2.87 K/UL
NEUTROPHILS NFR BLD AUTO: 46.2 %
NONHDLC SERPL-MCNC: 134 MG/DL
PLATELET # BLD AUTO: 263 K/UL
POTASSIUM SERPL-SCNC: 4.2 MMOL/L
PROT SERPL-MCNC: 6.8 G/DL
RBC # BLD: 4.71 M/UL
RBC # FLD: 13.1 %
SODIUM SERPL-SCNC: 142 MMOL/L
T4 FREE SERPL-MCNC: 1.2 NG/DL
TRIGL SERPL-MCNC: 134 MG/DL
TSH SERPL-ACNC: 1.4 UIU/ML
WBC # FLD AUTO: 6.21 K/UL

## 2022-08-18 PROBLEM — R51.9 BILATERAL HEADACHES: Status: ACTIVE | Noted: 2019-01-04

## 2022-08-25 ENCOUNTER — APPOINTMENT (OUTPATIENT)
Dept: CARDIOLOGY | Facility: CLINIC | Age: 52
End: 2022-08-25

## 2022-08-25 ENCOUNTER — NON-APPOINTMENT (OUTPATIENT)
Age: 52
End: 2022-08-25

## 2022-08-25 VITALS
DIASTOLIC BLOOD PRESSURE: 80 MMHG | WEIGHT: 145 LBS | SYSTOLIC BLOOD PRESSURE: 90 MMHG | BODY MASS INDEX: 27.4 KG/M2 | OXYGEN SATURATION: 96 % | RESPIRATION RATE: 16 BRPM | HEART RATE: 82 BPM | TEMPERATURE: 98.3 F

## 2022-08-25 DIAGNOSIS — R51.9 HEADACHE, UNSPECIFIED: ICD-10-CM

## 2022-08-25 PROCEDURE — 36415 COLL VENOUS BLD VENIPUNCTURE: CPT

## 2022-08-25 PROCEDURE — 99214 OFFICE O/P EST MOD 30 MIN: CPT | Mod: 25

## 2022-08-25 PROCEDURE — 93000 ELECTROCARDIOGRAM COMPLETE: CPT

## 2022-08-25 NOTE — HISTORY OF PRESENT ILLNESS
[FreeTextEntry1] :        The patient is a 51 year-old woman, daughter of MrMeg and Mrs. Camilo Ca, and who is well known to me from my previous practice at [facility]. She is under management of:\par        1) hyperlipidemia, on statin (Atorva)\par        2) long-standing asthma.\par        3) Treated Hypothyroidism, per Endo (Dr. Wolff) She is also using Synthroid chronically for hypothyroidism.\par        4) Headache syndrome, per neuro (Dr. FLAQUITA Silva)--seeing neuro about her recurrent and frequent headaches\par        5) Asthma remains under good control.  **Going to Dr. Ludin Dia for pulmonary, \par \par \par        Currently: She is feeling well. No symptoms. No chest pains and no dyspnea. Asthma is stable. \par Seeing her other consultants regularly\par She has been taking and tolerating Lipitor without issue.  Her last cholesterol was 198 with an LDL of 116. I called her and she agreed to redouble her dietary efforts. She does not want to increase the dosage of atorvastatin.\par \par EKG (12/16/2021):\par Sinus  Rhythm \par Nl axis and intervals\par Anterior t-wave changes persist, as before.\par Likely, normal variant

## 2022-08-26 LAB
ALBUMIN SERPL ELPH-MCNC: 4.4 G/DL
ALP BLD-CCNC: 114 U/L
ALT SERPL-CCNC: 22 U/L
ANION GAP SERPL CALC-SCNC: 13 MMOL/L
AST SERPL-CCNC: 25 U/L
BASOPHILS # BLD AUTO: 0.03 K/UL
BASOPHILS NFR BLD AUTO: 0.7 %
BILIRUB SERPL-MCNC: 0.3 MG/DL
BUN SERPL-MCNC: 10 MG/DL
CALCIUM SERPL-MCNC: 9.2 MG/DL
CHLORIDE SERPL-SCNC: 102 MMOL/L
CHOLEST SERPL-MCNC: 192 MG/DL
CO2 SERPL-SCNC: 26 MMOL/L
CREAT SERPL-MCNC: 0.97 MG/DL
EGFR: 70 ML/MIN/1.73M2
EOSINOPHIL # BLD AUTO: 0.4 K/UL
EOSINOPHIL NFR BLD AUTO: 9.2 %
GLUCOSE SERPL-MCNC: 108 MG/DL
HCT VFR BLD CALC: 41 %
HDLC SERPL-MCNC: 42 MG/DL
HGB BLD-MCNC: 13.3 G/DL
IMM GRANULOCYTES NFR BLD AUTO: 0.5 %
LDLC SERPL CALC-MCNC: 122 MG/DL
LYMPHOCYTES # BLD AUTO: 1.72 K/UL
LYMPHOCYTES NFR BLD AUTO: 39.4 %
MAN DIFF?: NORMAL
MCHC RBC-ENTMCNC: 29 PG
MCHC RBC-ENTMCNC: 32.4 GM/DL
MCV RBC AUTO: 89.3 FL
MONOCYTES # BLD AUTO: 0.41 K/UL
MONOCYTES NFR BLD AUTO: 9.4 %
NEUTROPHILS # BLD AUTO: 1.78 K/UL
NEUTROPHILS NFR BLD AUTO: 40.8 %
NONHDLC SERPL-MCNC: 151 MG/DL
PLATELET # BLD AUTO: 244 K/UL
POTASSIUM SERPL-SCNC: 4.2 MMOL/L
PROT SERPL-MCNC: 6.6 G/DL
RBC # BLD: 4.59 M/UL
RBC # FLD: 13.1 %
SODIUM SERPL-SCNC: 141 MMOL/L
TRIGL SERPL-MCNC: 142 MG/DL
WBC # FLD AUTO: 4.36 K/UL

## 2022-09-21 ENCOUNTER — APPOINTMENT (OUTPATIENT)
Dept: ENDOCRINOLOGY | Facility: CLINIC | Age: 52
End: 2022-09-21

## 2022-09-27 LAB
24R-OH-CALCIDIOL SERPL-MCNC: 44.8 PG/ML
25(OH)D3 SERPL-MCNC: 53.9 NG/ML
ACTH SER-ACNC: 30.7 PG/ML
ALBUMIN SERPL ELPH-MCNC: 4.4 G/DL
ALP BLD-CCNC: 118 U/L
ALT SERPL-CCNC: 26 U/L
ANION GAP SERPL CALC-SCNC: 12 MMOL/L
AST SERPL-CCNC: 25 U/L
BASOPHILS # BLD AUTO: 0.03 K/UL
BASOPHILS NFR BLD AUTO: 0.6 %
BILIRUB SERPL-MCNC: 0.2 MG/DL
BUN SERPL-MCNC: 13 MG/DL
CALCIUM SERPL-MCNC: 9.7 MG/DL
CALCIUM SERPL-MCNC: 9.7 MG/DL
CHLORIDE SERPL-SCNC: 103 MMOL/L
CO2 SERPL-SCNC: 28 MMOL/L
CORTIS SERPL-MCNC: 5 UG/DL
CREAT SERPL-MCNC: 0.9 MG/DL
EGFR: 77 ML/MIN/1.73M2
EOSINOPHIL # BLD AUTO: 0.57 K/UL
EOSINOPHIL NFR BLD AUTO: 11.4 %
GH SERPL-MCNC: 0.09 NG/ML
GLUCOSE SERPL-MCNC: 96 MG/DL
HCT VFR BLD CALC: 42.5 %
HGB BLD-MCNC: 14.1 G/DL
IGF-1 INTERP: NORMAL
IGF-I BLD-MCNC: 25 NG/ML
IMM GRANULOCYTES NFR BLD AUTO: 0.2 %
LYMPHOCYTES # BLD AUTO: 1.88 K/UL
LYMPHOCYTES NFR BLD AUTO: 37.6 %
MAN DIFF?: NORMAL
MCHC RBC-ENTMCNC: 29.9 PG
MCHC RBC-ENTMCNC: 33.2 GM/DL
MCV RBC AUTO: 90.2 FL
MONOCYTES # BLD AUTO: 0.39 K/UL
MONOCYTES NFR BLD AUTO: 7.8 %
NEUTROPHILS # BLD AUTO: 2.12 K/UL
NEUTROPHILS NFR BLD AUTO: 42.4 %
PARATHYROID HORMONE INTACT: 47 PG/ML
PLATELET # BLD AUTO: 250 K/UL
POTASSIUM SERPL-SCNC: 5.1 MMOL/L
PROT SERPL-MCNC: 6.6 G/DL
RBC # BLD: 4.71 M/UL
RBC # FLD: 13.1 %
SODIUM SERPL-SCNC: 143 MMOL/L
WBC # FLD AUTO: 5 K/UL

## 2022-10-04 ENCOUNTER — APPOINTMENT (OUTPATIENT)
Dept: ENDOCRINOLOGY | Facility: CLINIC | Age: 52
End: 2022-10-04

## 2022-10-04 VITALS — HEIGHT: 61 IN | BODY MASS INDEX: 27.75 KG/M2 | WEIGHT: 147 LBS

## 2022-10-04 PROCEDURE — 99215 OFFICE O/P EST HI 40 MIN: CPT | Mod: 95

## 2022-10-05 NOTE — REASON FOR VISIT
[Follow - Up] : a follow-up visit [Hypothyroidism] : hypothyroidism [Osteoporosis] : osteoporosis [Parent] : parent

## 2022-10-05 NOTE — HISTORY OF PRESENT ILLNESS
[Home] : at home, [unfilled] , at the time of the visit. [Medical Office: ([facility])___] : at the medical office located in  [Verbal consent obtained from patient] : the patient, [unfilled] [FreeTextEntry1] : Last Prolia shot 4/18/22\par Ms. ALY ARMAS is a 51 year old female\par coming for f/u hypothyroidism, osteoporosis  has also early menopause last period 2013\par keeps getting the inhaler Fluticasone , her asthma was getting worse lately per mother \par last Prolia end of last year 9/2019, was due march 2020 was afraid of COVID so she skipped it , also skipped 12/2020\par  Prolia 7/30/2018\par 2/13/2019\par 9/4/2019\par 6/2020\par 3/2021\par \par  Dr Raz ONOFRE started her on Omeprazole 10mg bid \par        on Vit D 2,000 IU qd for at least a month\par        has osteoporosis \par        had not been taking her meds end of December, also January and February each time for a week at a time due to gastroenteritis\par        labs reviewed with the pt, has low IGF1\par        TFts normal\par        has dot-cisterna magna sees Dr Silva Neurology last CT scan and last MRI head reviewed\par        labs done 2/19/15 showed TSH 2.6, FT4 0.8, none since thyroid was changed\par        has been on Synthroid since she was 14yo, on Synthroid 88mcg NAMAN changed 9/2016\par        has Osteopenia DEXA scan done by her Gyn at [facility] referred at [facility] done 1/17 mildy decreased , T score -2 both hip and spine \par repeated DEXA 5/19 c/w osteoporosis T score -2.6 left femur, improved , forearm osteopenia -2.0\par        calcium 500+ Vit D qd takes it 4hr after Synthroid \par        iPTH 60, \par         takes Vit D 2,000 IU qd \par        no persistent Therapy with prednisone, has asthma \par        has been on Phentermine by PCP for 3 months lost 20lb , then 10lb more since she was sick\par        thyroid US reviewed showed small nodule 5mm nearby her thyroid, lymph node versus parathyroid adenoma, however iPTH 60 with low D, will repeat once D level is normal\par \par labs reviewed very good

## 2022-12-14 ENCOUNTER — APPOINTMENT (OUTPATIENT)
Dept: ENDOCRINOLOGY | Facility: CLINIC | Age: 52
End: 2022-12-14

## 2022-12-14 VITALS
BODY MASS INDEX: 28.7 KG/M2 | OXYGEN SATURATION: 98 % | WEIGHT: 152 LBS | DIASTOLIC BLOOD PRESSURE: 60 MMHG | SYSTOLIC BLOOD PRESSURE: 102 MMHG | HEART RATE: 88 BPM | HEIGHT: 61 IN

## 2022-12-14 DIAGNOSIS — E66.3 OVERWEIGHT: ICD-10-CM

## 2022-12-14 PROCEDURE — 99215 OFFICE O/P EST HI 40 MIN: CPT

## 2022-12-14 NOTE — HISTORY OF PRESENT ILLNESS
[Home] : at home, [unfilled] , at the time of the visit. [Medical Office: ([facility])___] : at the medical office located in  [Verbal consent obtained from patient] : the patient, [unfilled] [FreeTextEntry1] : Last Prolia shot 11/30/22 \par Ms. ALY ARMAS is a 52 year old female\par coming for f/u hypothyroidism, osteoporosis  has also early menopause last period 2013\par was on Prednisone 6 weeks ago for 5 days for asthma Dr Varela 11/14/22 [place] \par keeps getting the inhaler Fluticasone , her asthma was getting worse lately per mother \par last Prolia end of last year 9/2019, was due march 2020 was afraid of COVID so she skipped it , also skipped 12/2020\par  Prolia 7/30/2018\par 2/13/2019\par 9/4/2019\par 6/2020\par 3/2021\par \par  Dr Raz ONOFRE started her on Omeprazole 10mg bid \par        on Vit D 2,000 IU qd for at least a month\par        has osteoporosis \par        had not been taking her meds end of December, also January and February each time for a week at a time due to gastroenteritis\par        labs reviewed with the pt, has low IGF1\par        TFts normal\par        has dot-cisterna magna sees Dr Silva Neurology last CT scan and last MRI head reviewed\par        labs done 2/19/15 showed TSH 2.6, FT4 0.8, none since thyroid was changed\par        has been on Synthroid since she was 14yo, on Synthroid 88mcg NAMAN changed 9/2016\par        has Osteopenia DEXA scan done by her Gyn at [facility] referred at [facility] done 1/17 mildy decreased , T score -2 both hip and spine \par repeated DEXA 5/19 c/w osteoporosis T score -2.6 left femur, improved , forearm osteopenia -2.0\par        calcium 500+ Vit D qd takes it 4hr after Synthroid \par        iPTH 60, \par         takes Vit D 2,000 IU qd \par        no persistent Therapy with prednisone, has asthma \par        has been on Phentermine by PCP for 3 months lost 20lb , then 10lb more since she was sick\par        thyroid US reviewed showed small nodule 5mm nearby her thyroid, lymph node versus parathyroid adenoma, however iPTH 60 with low D, will repeat once D level is normal\par \par labs reviewed very good

## 2022-12-15 RX ORDER — LANSOPRAZOLE 30 MG/1
30 CAPSULE, DELAYED RELEASE ORAL
Qty: 30 | Refills: 0 | Status: ACTIVE | COMMUNITY
Start: 2022-08-25

## 2022-12-15 RX ORDER — FLUTICASONE PROPIONATE 220 UG/1
220 AEROSOL, METERED RESPIRATORY (INHALATION)
Qty: 12 | Refills: 0 | Status: ACTIVE | COMMUNITY
Start: 2022-04-26

## 2022-12-15 RX ORDER — VENLAFAXINE HYDROCHLORIDE 37.5 MG/1
37.5 CAPSULE, EXTENDED RELEASE ORAL
Qty: 30 | Refills: 0 | Status: ACTIVE | COMMUNITY
Start: 2022-04-01

## 2022-12-15 RX ORDER — LEVALBUTEROL HYDROCHLORIDE 1.25 MG/3ML
1.25 SOLUTION RESPIRATORY (INHALATION)
Qty: 75 | Refills: 0 | Status: ACTIVE | COMMUNITY
Start: 2022-11-03

## 2022-12-15 RX ORDER — BUDESONIDE 0.5 MG/2ML
0.5 INHALANT ORAL
Qty: 120 | Refills: 0 | Status: ACTIVE | COMMUNITY
Start: 2022-09-08

## 2022-12-22 ENCOUNTER — NON-APPOINTMENT (OUTPATIENT)
Age: 52
End: 2022-12-22

## 2022-12-22 ENCOUNTER — APPOINTMENT (OUTPATIENT)
Dept: CARDIOLOGY | Facility: CLINIC | Age: 52
End: 2022-12-22

## 2022-12-22 VITALS
BODY MASS INDEX: 28.7 KG/M2 | SYSTOLIC BLOOD PRESSURE: 100 MMHG | HEIGHT: 61 IN | DIASTOLIC BLOOD PRESSURE: 64 MMHG | WEIGHT: 152 LBS | HEART RATE: 68 BPM | OXYGEN SATURATION: 98 %

## 2022-12-22 PROCEDURE — 99214 OFFICE O/P EST MOD 30 MIN: CPT | Mod: 25

## 2022-12-22 PROCEDURE — 36415 COLL VENOUS BLD VENIPUNCTURE: CPT

## 2022-12-22 PROCEDURE — 93000 ELECTROCARDIOGRAM COMPLETE: CPT

## 2022-12-22 NOTE — HISTORY OF PRESENT ILLNESS
[FreeTextEntry1] :        The patient is a 51 year-old woman, daughter of MrMeg and Mrs. Camilo Ca, and who is well known to me from my previous practice at [facility]. She is under management of:\par        1) hyperlipidemia, on statin (Atorva)\par        2) long-standing asthma.\par        3) Treated Hypothyroidism, per Endo (Dr. Wolff) She is also using Synthroid chronically for hypothyroidism.\par        4) Headache syndrome, per neuro (Dr. FLAQUITA Silva)--seeing neuro about her recurrent and frequent headaches\par        5) Asthma remains under good control.  **Going to Dr. Ludin Dia for pulmonary, \par \par \par        Currently: She is feeling well. No symptoms. No chest pains and no dyspnea. Asthma is stable. \par Seeing her other consultants regularly\par She has been taking and tolerating Lipitor without issue.  Her last cholesterol was 198 with an LDL of 116. I called her and she agreed to redouble her dietary efforts. She does not want to increase the dosage of atorvastatin.\par \par EKG (12/16/2021):\par Sinus  Rhythm \par Nl axis and intervals\par Anterior t-wave changes persist, as before.\par Likely, normal variant

## 2022-12-23 LAB
ALBUMIN SERPL ELPH-MCNC: 4.3 G/DL
ALP BLD-CCNC: 85 U/L
ALT SERPL-CCNC: 45 U/L
ANION GAP SERPL CALC-SCNC: 11 MMOL/L
AST SERPL-CCNC: 36 U/L
BASOPHILS # BLD AUTO: 0.03 K/UL
BASOPHILS NFR BLD AUTO: 0.5 %
BILIRUB SERPL-MCNC: 0.4 MG/DL
BUN SERPL-MCNC: 11 MG/DL
CALCIUM SERPL-MCNC: 9.7 MG/DL
CHLORIDE SERPL-SCNC: 106 MMOL/L
CHOLEST SERPL-MCNC: 192 MG/DL
CO2 SERPL-SCNC: 26 MMOL/L
CREAT SERPL-MCNC: 0.98 MG/DL
EGFR: 69 ML/MIN/1.73M2
EOSINOPHIL # BLD AUTO: 0.16 K/UL
EOSINOPHIL NFR BLD AUTO: 2.7 %
GLUCOSE SERPL-MCNC: 105 MG/DL
HCT VFR BLD CALC: 40.2 %
HDLC SERPL-MCNC: 43 MG/DL
HGB BLD-MCNC: 13.3 G/DL
IMM GRANULOCYTES NFR BLD AUTO: 0.2 %
LDLC SERPL CALC-MCNC: 111 MG/DL
LYMPHOCYTES # BLD AUTO: 2.69 K/UL
LYMPHOCYTES NFR BLD AUTO: 45 %
MAN DIFF?: NORMAL
MCHC RBC-ENTMCNC: 29.5 PG
MCHC RBC-ENTMCNC: 33.1 GM/DL
MCV RBC AUTO: 89.1 FL
MONOCYTES # BLD AUTO: 0.5 K/UL
MONOCYTES NFR BLD AUTO: 8.4 %
NEUTROPHILS # BLD AUTO: 2.59 K/UL
NEUTROPHILS NFR BLD AUTO: 43.2 %
NONHDLC SERPL-MCNC: 149 MG/DL
PLATELET # BLD AUTO: 248 K/UL
POTASSIUM SERPL-SCNC: 5.1 MMOL/L
PROT SERPL-MCNC: 6.6 G/DL
RBC # BLD: 4.51 M/UL
RBC # FLD: 13.9 %
SODIUM SERPL-SCNC: 143 MMOL/L
TRIGL SERPL-MCNC: 189 MG/DL
WBC # FLD AUTO: 5.98 K/UL

## 2023-03-15 ENCOUNTER — APPOINTMENT (OUTPATIENT)
Dept: ENDOCRINOLOGY | Facility: CLINIC | Age: 53
End: 2023-03-15
Payer: MEDICARE

## 2023-03-15 VITALS
SYSTOLIC BLOOD PRESSURE: 110 MMHG | HEIGHT: 61 IN | BODY MASS INDEX: 28.32 KG/M2 | OXYGEN SATURATION: 98 % | HEART RATE: 78 BPM | WEIGHT: 150 LBS | DIASTOLIC BLOOD PRESSURE: 70 MMHG

## 2023-03-15 PROCEDURE — 99215 OFFICE O/P EST HI 40 MIN: CPT

## 2023-03-15 NOTE — HISTORY OF PRESENT ILLNESS
[FreeTextEntry1] : Last Prolia shot 11/30/22 \par Ms. ALY ARMAS is a 52 year old female\par coming for f/u hypothyroidism, osteoporosis  has also early menopause last period 2013\par was on Prednisone 6 weeks ago for 5 days for asthma Dr Varela 11/14/22 [place] \par keeps getting the inhaler Fluticasone , her asthma was getting worse lately per mother \par last Prolia end of last year 9/2019, was due march 2020 was afraid of COVID so she skipped it , also skipped 12/2020\par  Prolia 7/30/2018\par 2/13/2019\par 9/4/2019\par 6/2020\par 3/2021\par \par  Dr Raz ONOFRE started her on Omeprazole 10mg bid \par        on Vit D 2,000 IU qd for at least a month\par        has osteoporosis \par        had not been taking her meds end of December, also January and February each time for a week at a time due to gastroenteritis\par        labs reviewed with the pt, has low IGF1\par        TFts normal\par        has dot-cisterna magna sees Dr Silva Neurology last CT scan and last MRI head reviewed\par        labs done 2/19/15 showed TSH 2.6, FT4 0.8, none since thyroid was changed\par        has been on Synthroid since she was 14yo, on Synthroid 88mcg NAMAN changed 9/2016\par        has Osteopenia DEXA scan done by her Gyn at [facility] referred at [facility] done 1/17 mildy decreased , T score -2 both hip and spine \par repeated DEXA 5/19 c/w osteoporosis T score -2.6 left femur, improved , forearm osteopenia -2.0\par        calcium 500+ Vit D qd takes it 4hr after Synthroid \par        iPTH 60, \par         takes Vit D 2,000 IU qd \par        no persistent Therapy with prednisone, has asthma \par        has been on Phentermine by PCP for 3 months lost 20lb , then 10lb more since she was sick\par        thyroid US reviewed showed small nodule 5mm nearby her thyroid, lymph node versus parathyroid adenoma, however iPTH 60 with low D, will repeat once D level is normal\par \par labs reviewed very good

## 2023-03-16 RX ORDER — DENOSUMAB 60 MG/ML
60 INJECTION SUBCUTANEOUS
Qty: 1 | Refills: 0 | Status: DISCONTINUED | COMMUNITY
Start: 2020-06-11 | End: 2023-03-16

## 2023-04-20 ENCOUNTER — APPOINTMENT (OUTPATIENT)
Dept: CARDIOLOGY | Facility: CLINIC | Age: 53
End: 2023-04-20
Payer: MEDICARE

## 2023-04-20 VITALS
WEIGHT: 144 LBS | TEMPERATURE: 98 F | HEART RATE: 99 BPM | SYSTOLIC BLOOD PRESSURE: 110 MMHG | HEIGHT: 61 IN | DIASTOLIC BLOOD PRESSURE: 70 MMHG | BODY MASS INDEX: 27.19 KG/M2 | OXYGEN SATURATION: 97 %

## 2023-04-20 PROCEDURE — 36415 COLL VENOUS BLD VENIPUNCTURE: CPT

## 2023-04-20 PROCEDURE — 99214 OFFICE O/P EST MOD 30 MIN: CPT | Mod: 25

## 2023-04-21 LAB
ALBUMIN SERPL ELPH-MCNC: 4.4 G/DL
ALP BLD-CCNC: 89 U/L
ALT SERPL-CCNC: 26 U/L
ANION GAP SERPL CALC-SCNC: 14 MMOL/L
AST SERPL-CCNC: 24 U/L
BASOPHILS # BLD AUTO: 0.02 K/UL
BASOPHILS NFR BLD AUTO: 0.3 %
BILIRUB SERPL-MCNC: 0.2 MG/DL
BUN SERPL-MCNC: 16 MG/DL
CALCIUM SERPL-MCNC: 9.7 MG/DL
CHLORIDE SERPL-SCNC: 104 MMOL/L
CHOLEST SERPL-MCNC: 188 MG/DL
CO2 SERPL-SCNC: 26 MMOL/L
CREAT SERPL-MCNC: 1.04 MG/DL
EGFR: 64 ML/MIN/1.73M2
EOSINOPHIL # BLD AUTO: 0.11 K/UL
EOSINOPHIL NFR BLD AUTO: 1.8 %
GLUCOSE SERPL-MCNC: 74 MG/DL
HCT VFR BLD CALC: 41.1 %
HDLC SERPL-MCNC: 47 MG/DL
HGB BLD-MCNC: 13.4 G/DL
IMM GRANULOCYTES NFR BLD AUTO: 0 %
LDLC SERPL CALC-MCNC: 102 MG/DL
LYMPHOCYTES # BLD AUTO: 2.61 K/UL
LYMPHOCYTES NFR BLD AUTO: 43.2 %
MAN DIFF?: NORMAL
MCHC RBC-ENTMCNC: 30.2 PG
MCHC RBC-ENTMCNC: 32.6 GM/DL
MCV RBC AUTO: 92.6 FL
MONOCYTES # BLD AUTO: 0.42 K/UL
MONOCYTES NFR BLD AUTO: 7 %
NEUTROPHILS # BLD AUTO: 2.88 K/UL
NEUTROPHILS NFR BLD AUTO: 47.7 %
NONHDLC SERPL-MCNC: 141 MG/DL
PLATELET # BLD AUTO: 257 K/UL
POTASSIUM SERPL-SCNC: 4.3 MMOL/L
PROT SERPL-MCNC: 6.5 G/DL
RBC # BLD: 4.44 M/UL
RBC # FLD: 13.7 %
SODIUM SERPL-SCNC: 143 MMOL/L
TRIGL SERPL-MCNC: 192 MG/DL
WBC # FLD AUTO: 6.04 K/UL

## 2023-04-21 NOTE — HISTORY OF PRESENT ILLNESS
[FreeTextEntry1] :        The patient is a 52 year-old woman, daughter of MrMeg and Mrs. Camilo Ca, and who is well known to me from my previous practice at [facility]. She is under management of:\par        1) hyperlipidemia, on statin (Atorva)\par        2) long-standing asthma.\par        3) Treated Hypothyroidism, per Endo (Dr. Wolff) She is also using Synthroid chronically for hypothyroidism.\par        4) Headache syndrome, per neuro (Dr. FLAQUITA Silva)--seeing neuro about her recurrent and frequent headaches\par        5) Asthma remains under good control.  **Going to Dr. Ludin Dia for pulmonary, \par \par \par        Currently: She is feeling well. No symptoms. No chest pains and no dyspnea. Asthma is stable. \par Seeing her other consultants regularly\par She has been taking and tolerating Lipitor without issue.  Her last cholesterol was 198 with an LDL of 116. I called her and she agreed to redouble her dietary efforts. She does not want to increase the dosage of atorvastatin.\par \par EKG (12/16/2021):\par Sinus  Rhythm \par Nl axis and intervals\par Anterior t-wave changes persist, as before.\par Likely, normal variant

## 2023-04-21 NOTE — PHYSICAL EXAM
[General Appearance - Well Developed] : well developed [Normal Appearance] : normal appearance [Well Groomed] : well groomed [General Appearance - Well Nourished] : well nourished [No Deformities] : no deformities [General Appearance - In No Acute Distress] : no acute distress [Normal Conjunctiva] : the conjunctiva exhibited no abnormalities [Eyelids - No Xanthelasma] : the eyelids demonstrated no xanthelasmas [Normal Oral Mucosa] : normal oral mucosa [No Oral Pallor] : no oral pallor [No Oral Cyanosis] : no oral cyanosis [Normal Jugular Venous A Waves Present] : normal jugular venous A waves present [Normal Jugular Venous V Waves Present] : normal jugular venous V waves present [No Jugular Venous Bull A Waves] : no jugular venous bull A waves [Respiration, Rhythm And Depth] : normal respiratory rhythm and effort [Exaggerated Use Of Accessory Muscles For Inspiration] : no accessory muscle use [Auscultation Breath Sounds / Voice Sounds] : lungs were clear to auscultation bilaterally [Abdomen Soft] : soft [Abdomen Tenderness] : non-tender [Abdomen Mass (___ Cm)] : no abdominal mass palpated [Abnormal Walk] : normal gait [Gait - Sufficient For Exercise Testing] : the gait was sufficient for exercise testing [Nail Clubbing] : no clubbing of the fingernails [Cyanosis, Localized] : no localized cyanosis [Petechial Hemorrhages (___cm)] : no petechial hemorrhages [Skin Color & Pigmentation] : normal skin color and pigmentation [] : no rash [No Venous Stasis] : no venous stasis [Skin Lesions] : no skin lesions [No Skin Ulcers] : no skin ulcer [No Xanthoma] : no  xanthoma was observed [Oriented To Time, Place, And Person] : oriented to person, place, and time [Affect] : the affect was normal [Mood] : the mood was normal [No Anxiety] : not feeling anxious [5th Left ICS - MCL] : palpated at the 5th LICS in the midclavicular line [Normal] : normal [Normal Rate] : normal [Rhythm Regular] : regular [Normal S1] : normal S1 [Normal S2] : normal S2 [No Murmur] : no murmurs heard [No Abnormalities] : the abdominal aorta was not enlarged and no bruit was heard [Left Carotid Bruit] : no bruit heard over the left carotid [Right Carotid Bruit] : no bruit heard over the right carotid [Right Femoral Bruit] : no bruit heard over the right femoral artery [Left Femoral Bruit] : no bruit heard over the left femoral artery [Rt] : no varicose veins of the right leg [Lt] : no varicose veins of the left leg

## 2023-06-14 ENCOUNTER — APPOINTMENT (OUTPATIENT)
Dept: ENDOCRINOLOGY | Facility: CLINIC | Age: 53
End: 2023-06-14
Payer: MEDICARE

## 2023-06-14 VITALS — WEIGHT: 144 LBS | HEIGHT: 61 IN | BODY MASS INDEX: 27.19 KG/M2

## 2023-06-14 LAB
25(OH)D3 SERPL-MCNC: 59.9 NG/ML
ACTH SER-ACNC: 34.5 PG/ML
ALBUMIN SERPL ELPH-MCNC: 4.4 G/DL
ALP BLD-CCNC: 90 U/L
ALT SERPL-CCNC: 20 U/L
ANION GAP SERPL CALC-SCNC: 14 MMOL/L
AST SERPL-CCNC: 24 U/L
BILIRUB SERPL-MCNC: 0.3 MG/DL
BUN SERPL-MCNC: 17 MG/DL
CALCIUM SERPL-MCNC: 9.8 MG/DL
CALCIUM SERPL-MCNC: 9.8 MG/DL
CHLORIDE SERPL-SCNC: 103 MMOL/L
CO2 SERPL-SCNC: 26 MMOL/L
CORTIS SERPL-MCNC: 3.2 UG/DL
CREAT SERPL-MCNC: 0.97 MG/DL
EGFR: 70 ML/MIN/1.73M2
ESTIMATED AVERAGE GLUCOSE: 117 MG/DL
ESTRADIOL SERPL-MCNC: <5 PG/ML
FSH SERPL-MCNC: 20.5 IU/L
GH SERPL-MCNC: 0.06 NG/ML
GLUCOSE SERPL-MCNC: 60 MG/DL
HBA1C MFR BLD HPLC: 5.7 %
IGF-1 INTERP: NORMAL
IGF-I BLD-MCNC: 34 NG/ML
LH SERPL-ACNC: 12.1 IU/L
OSMOLALITY SERPL: 296 MOSMOL/KG
PARATHYROID HORMONE INTACT: 38 PG/ML
PHOSPHATE SERPL-MCNC: 4.2 MG/DL
POTASSIUM SERPL-SCNC: 4.3 MMOL/L
PROLACTIN SERPL-MCNC: 17.9 NG/ML
PROT SERPL-MCNC: 6.4 G/DL
SODIUM SERPL-SCNC: 144 MMOL/L
T4 FREE SERPL-MCNC: 1.4 NG/DL
TSH SERPL-ACNC: 1.11 UIU/ML

## 2023-06-14 PROCEDURE — 99215 OFFICE O/P EST HI 40 MIN: CPT | Mod: 95

## 2023-06-14 NOTE — HISTORY OF PRESENT ILLNESS
[Home] : at home, [unfilled] , at the time of the visit. [Medical Office: ([facility])___] : at the medical office located in  [Verbal consent obtained from patient] : the patient, [unfilled] [FreeTextEntry1] : Last Prolia shot 6/2/23\par Ms. ALY ARMAS is a 52 year old female\par coming for f/u hypothyroidism, osteoporosis  has also early menopause last period 2013\par was on Prednisone 6 weeks ago for 5 days for asthma Dr Varela 11/14/22 [place] \par keeps getting the inhaler Fluticasone , her asthma was getting worse lately per mother \par last Prolia end of last year 9/2019, was due march 2020 was afraid of COVID so she skipped it , also skipped 12/2020\par  Prolia 7/30/2018\par 2/13/2019\par 9/4/2019\par 6/2020\par 3/2021\par \par  Dr Raz ONOFRE started her on Omeprazole 10mg bid \par        on Vit D 2,000 IU qd for at least a month\par        has osteoporosis \par        had not been taking her meds end of December, also January and February each time for a week at a time due to gastroenteritis\par        labs reviewed with the pt, has low IGF1\par        TFts normal\par        has dot-cisterna magna sees Dr Silva Neurology last CT scan and last MRI head reviewed\par        labs done 2/19/15 showed TSH 2.6, FT4 0.8, none since thyroid was changed\par        has been on Synthroid since she was 14yo, on Synthroid 88mcg NAMAN changed 9/2016\par        has Osteopenia DEXA scan done by her Gyn at [facility] referred at [facility] done 1/17 mildy decreased , T score -2 both hip and spine \par repeated DEXA 5/19 c/w osteoporosis T score -2.6 left femur, improved , forearm osteopenia -2.0\par        calcium 500+ Vit D qd takes it 4hr after Synthroid \par        iPTH 60, \par         takes Vit D 2,000 IU qd \par        no persistent Therapy with prednisone, has asthma \par        has been on Phentermine by PCP for 3 months lost 20lb , then 10lb more since she was sick\par        thyroid US reviewed showed small nodule 5mm nearby her thyroid, lymph node versus parathyroid adenoma, however iPTH 60 with low D, will repeat once D level is normal\par \par labs reviewed very good

## 2023-07-12 ENCOUNTER — APPOINTMENT (OUTPATIENT)
Dept: ENDOCRINOLOGY | Facility: CLINIC | Age: 53
End: 2023-07-12

## 2023-08-24 ENCOUNTER — NON-APPOINTMENT (OUTPATIENT)
Age: 53
End: 2023-08-24

## 2023-08-24 ENCOUNTER — APPOINTMENT (OUTPATIENT)
Dept: CARDIOLOGY | Facility: CLINIC | Age: 53
End: 2023-08-24
Payer: MEDICARE

## 2023-08-24 VITALS
OXYGEN SATURATION: 100 % | BODY MASS INDEX: 27.56 KG/M2 | WEIGHT: 146 LBS | HEART RATE: 70 BPM | DIASTOLIC BLOOD PRESSURE: 60 MMHG | HEIGHT: 61 IN | SYSTOLIC BLOOD PRESSURE: 96 MMHG

## 2023-08-24 PROCEDURE — 36415 COLL VENOUS BLD VENIPUNCTURE: CPT

## 2023-08-24 PROCEDURE — 93000 ELECTROCARDIOGRAM COMPLETE: CPT

## 2023-08-24 PROCEDURE — 99214 OFFICE O/P EST MOD 30 MIN: CPT | Mod: 25

## 2023-08-24 RX ORDER — PREDNISONE 20 MG/1
20 TABLET ORAL
Qty: 15 | Refills: 0 | Status: DISCONTINUED | COMMUNITY
Start: 2022-10-12 | End: 2023-08-24

## 2023-08-24 RX ORDER — FLUTICASONE PROPIONATE 110 UG/1
110 AEROSOL, METERED RESPIRATORY (INHALATION)
Refills: 0 | Status: DISCONTINUED | COMMUNITY
End: 2023-08-24

## 2023-08-24 NOTE — HISTORY OF PRESENT ILLNESS
[FreeTextEntry1] :        The patient is a 52 year-old woman, daughter of MrMeg and Mrs. Camilo Ca, and who is well known to me from my previous practice at [facility]. She is under management of:\par         1) hyperlipidemia, on statin (Atorva)\par         2) long-standing asthma.\par         3) Treated Hypothyroidism, per Endo (Dr. Wolff) She is also using Synthroid chronically for hypothyroidism.\par         4) Headache syndrome, per neuro (Dr. FLAQUITA Silva)--seeing neuro about her recurrent and frequent headaches\par         5) Asthma remains under good control.  **Going to Dr. Ludin Dia for pulmonary, \par  \par  \par         Currently: She is feeling well. No symptoms. No chest pains and no dyspnea. Asthma is stable. \par  Seeing her other consultants regularly\par  She has been taking and tolerating Lipitor without issue.  Her last cholesterol was 198 with an LDL of 116. I called her and she agreed to redouble her dietary efforts. She does not want to increase the dosage of atorvastatin.\par  \par  EKG (12/16/2021):\par  Sinus  Rhythm \par  Nl axis and intervals\par  Anterior t-wave changes persist, as before.\par  Likely, normal variant

## 2023-08-25 LAB
ALBUMIN SERPL ELPH-MCNC: 4.4 G/DL
ALP BLD-CCNC: 84 U/L
ALT SERPL-CCNC: 19 U/L
ANION GAP SERPL CALC-SCNC: 11 MMOL/L
AST SERPL-CCNC: 24 U/L
BILIRUB SERPL-MCNC: 0.3 MG/DL
BUN SERPL-MCNC: 12 MG/DL
CALCIUM SERPL-MCNC: 9.3 MG/DL
CHLORIDE SERPL-SCNC: 101 MMOL/L
CHOLEST SERPL-MCNC: 196 MG/DL
CO2 SERPL-SCNC: 26 MMOL/L
CREAT SERPL-MCNC: 0.95 MG/DL
EGFR: 72 ML/MIN/1.73M2
GLUCOSE SERPL-MCNC: 92 MG/DL
HDLC SERPL-MCNC: 48 MG/DL
LDLC SERPL CALC-MCNC: 112 MG/DL
NONHDLC SERPL-MCNC: 147 MG/DL
POTASSIUM SERPL-SCNC: 4.6 MMOL/L
PROT SERPL-MCNC: 6.6 G/DL
SODIUM SERPL-SCNC: 139 MMOL/L
TRIGL SERPL-MCNC: 200 MG/DL

## 2023-09-27 ENCOUNTER — APPOINTMENT (OUTPATIENT)
Dept: ENDOCRINOLOGY | Facility: CLINIC | Age: 53
End: 2023-09-27
Payer: MEDICARE

## 2023-09-27 VITALS
SYSTOLIC BLOOD PRESSURE: 118 MMHG | DIASTOLIC BLOOD PRESSURE: 70 MMHG | HEIGHT: 61 IN | HEART RATE: 112 BPM | OXYGEN SATURATION: 98 % | WEIGHT: 140 LBS | BODY MASS INDEX: 26.43 KG/M2

## 2023-09-27 DIAGNOSIS — R79.89 OTHER SPECIFIED ABNORMAL FINDINGS OF BLOOD CHEMISTRY: ICD-10-CM

## 2023-09-27 DIAGNOSIS — F80.81 CHILDHOOD ONSET FLUENCY DISORDER: ICD-10-CM

## 2023-09-27 LAB
25(OH)D3 SERPL-MCNC: 57.6 NG/ML
ALBUMIN SERPL ELPH-MCNC: 4.4 G/DL
ALP BLD-CCNC: 77 U/L
ALT SERPL-CCNC: 18 U/L
ANION GAP SERPL CALC-SCNC: 13 MMOL/L
AST SERPL-CCNC: 23 U/L
BILIRUB SERPL-MCNC: 0.3 MG/DL
BUN SERPL-MCNC: 11 MG/DL
CALCIUM SERPL-MCNC: 9.2 MG/DL
CALCIUM SERPL-MCNC: 9.2 MG/DL
CHLORIDE SERPL-SCNC: 100 MMOL/L
CHOLEST SERPL-MCNC: 182 MG/DL
CO2 SERPL-SCNC: 25 MMOL/L
COLLAGEN CTX SERPL-MCNC: 31 PG/ML
CREAT SERPL-MCNC: 1.02 MG/DL
EGFR: 66 ML/MIN/1.73M2
ESTIMATED AVERAGE GLUCOSE: 117 MG/DL
GLUCOSE SERPL-MCNC: 82 MG/DL
HBA1C MFR BLD HPLC: 5.7 %
HDLC SERPL-MCNC: 47 MG/DL
LDLC SERPL CALC-MCNC: 101 MG/DL
MAGNESIUM SERPL-MCNC: 1.9 MG/DL
NONHDLC SERPL-MCNC: 135 MG/DL
PARATHYROID HORMONE INTACT: 43 PG/ML
PHOSPHATE SERPL-MCNC: 3.3 MG/DL
POTASSIUM SERPL-SCNC: 4 MMOL/L
PROT SERPL-MCNC: 6.6 G/DL
SODIUM SERPL-SCNC: 139 MMOL/L
T4 FREE SERPL-MCNC: 1.1 NG/DL
TRIGL SERPL-MCNC: 196 MG/DL
TSH SERPL-ACNC: 4.01 UIU/ML

## 2023-09-27 PROCEDURE — 99215 OFFICE O/P EST HI 40 MIN: CPT

## 2023-09-27 RX ORDER — CLONAZEPAM 0.5 MG/1
0.5 TABLET, ORALLY DISINTEGRATING ORAL
Qty: 14 | Refills: 0 | Status: DISCONTINUED | COMMUNITY
Start: 2020-06-25 | End: 2023-09-27

## 2023-12-06 LAB — COLLAGEN CTX SERPL-MCNC: 57 PG/ML

## 2023-12-06 RX ORDER — DENOSUMAB 60 MG/ML
60 INJECTION SUBCUTANEOUS
Qty: 1 | Refills: 0 | Status: ACTIVE | OUTPATIENT
Start: 2023-02-27

## 2023-12-07 RX ORDER — VENLAFAXINE HYDROCHLORIDE 150 MG/1
150 CAPSULE, EXTENDED RELEASE ORAL DAILY
Refills: 0 | Status: ACTIVE | COMMUNITY

## 2023-12-14 ENCOUNTER — NON-APPOINTMENT (OUTPATIENT)
Age: 53
End: 2023-12-14

## 2023-12-14 ENCOUNTER — APPOINTMENT (OUTPATIENT)
Dept: CARDIOLOGY | Facility: CLINIC | Age: 53
End: 2023-12-14
Payer: MEDICARE

## 2023-12-14 VITALS — HEIGHT: 61 IN | WEIGHT: 138 LBS | HEART RATE: 98 BPM | BODY MASS INDEX: 26.06 KG/M2 | OXYGEN SATURATION: 100 %

## 2023-12-14 PROCEDURE — 99213 OFFICE O/P EST LOW 20 MIN: CPT | Mod: 25

## 2023-12-14 PROCEDURE — 36415 COLL VENOUS BLD VENIPUNCTURE: CPT

## 2023-12-14 PROCEDURE — 93000 ELECTROCARDIOGRAM COMPLETE: CPT

## 2023-12-14 NOTE — HISTORY OF PRESENT ILLNESS
[FreeTextEntry1] :        The patient is a 53 year-old woman, daughter of MrMeg and Mrs. Camilo Ca, and who is well known to me from my previous practice at [facility]. She is under management of:        1) hyperlipidemia, on statin (Atorva)        2) long-standing asthma.        3) Treated Hypothyroidism, per Endo (Dr. Wolff) She is also using Synthroid chronically for hypothyroidism.        4) Headache syndrome, per neuro (Dr. FLAQUITA Silva)--seeing neuro about her recurrent and frequent headaches        5) Asthma remains under good control.  **Going to Dr. Ludin Dia for pulmonary,           Currently: She is feeling well. No symptoms. No chest pains and no dyspnea. Asthma is stable.  Seeing her other consultants regularly She has been taking and tolerating Lipitor without issue.  Her last cholesterol was 198 with an LDL of 116. I called her and she agreed to redouble her dietary efforts. She does not want to increase the dosage of atorvastatin.  EKG (12/16/2021): Sinus Rhythm  Nl axis and intervals Anterior t-wave changes persist, as before. Likely, normal variant

## 2023-12-14 NOTE — PHYSICAL EXAM
[Right Carotid Bruit] : no bruit heard over the right carotid [Left Carotid Bruit] : no bruit heard over the left carotid [Right Femoral Bruit] : no bruit heard over the right femoral artery [Left Femoral Bruit] : no bruit heard over the left femoral artery [Rt] : no varicose veins of the right leg [Lt] : no varicose veins of the left leg

## 2023-12-14 NOTE — ASSESSMENT
[FreeTextEntry1] : Assessment Dyslipidemia (272.4) (E78.5) Asthma (493.90) (J45.909) Hypothyroidism (244.9) (E03.9) Encounter for preventive health examination (V70.0) (Z00.00) Assessment: 1. Mixed hyperlipidemia - E78.2 (Primary) 2. Asthma - J45.909 3. Hypothyroidism - E03.9 4. Vitamin D deficiency - E55.9 5. Headache - R51 6. Primary insomnia - F51.01  Discussion:  1) Hyperlipidemia- on Lipitor. Last LDL 81 in March 2) Hypothyroidism and osteopenia--per [place] (Dr. Wolff) 3) Asthma-stable 4) Insomnia-chronic X many, many years. Agree with melatonin. I have recommended an exercise program too. She gets no exercise.

## 2023-12-15 LAB
ALBUMIN SERPL ELPH-MCNC: 4.7 G/DL
ALP BLD-CCNC: 83 U/L
ALT SERPL-CCNC: 20 U/L
ANION GAP SERPL CALC-SCNC: 11 MMOL/L
AST SERPL-CCNC: 23 U/L
BILIRUB SERPL-MCNC: 0.3 MG/DL
BUN SERPL-MCNC: 14 MG/DL
CALCIUM SERPL-MCNC: 10.2 MG/DL
CHLORIDE SERPL-SCNC: 101 MMOL/L
CHOLEST SERPL-MCNC: 224 MG/DL
CO2 SERPL-SCNC: 29 MMOL/L
CREAT SERPL-MCNC: 1.08 MG/DL
EGFR: 61 ML/MIN/1.73M2
GLUCOSE SERPL-MCNC: 88 MG/DL
HCT VFR BLD CALC: 43.7 %
HDLC SERPL-MCNC: 59 MG/DL
HGB BLD-MCNC: 14.1 G/DL
LDLC SERPL CALC-MCNC: 137 MG/DL
MCHC RBC-ENTMCNC: 29.6 PG
MCHC RBC-ENTMCNC: 32.3 GM/DL
MCV RBC AUTO: 91.6 FL
NONHDLC SERPL-MCNC: 165 MG/DL
PLATELET # BLD AUTO: 267 K/UL
POTASSIUM SERPL-SCNC: 4.7 MMOL/L
PROT SERPL-MCNC: 7 G/DL
RBC # BLD: 4.77 M/UL
RBC # FLD: 13.7 %
SODIUM SERPL-SCNC: 141 MMOL/L
TRIGL SERPL-MCNC: 158 MG/DL
WBC # FLD AUTO: 6.8 K/UL

## 2023-12-15 RX ORDER — ATORVASTATIN CALCIUM 20 MG/1
20 TABLET, FILM COATED ORAL
Qty: 90 | Refills: 3 | Status: ACTIVE | COMMUNITY
Start: 1900-01-01 | End: 1900-01-01

## 2024-01-31 ENCOUNTER — APPOINTMENT (OUTPATIENT)
Dept: ENDOCRINOLOGY | Facility: CLINIC | Age: 54
End: 2024-01-31
Payer: MEDICARE

## 2024-01-31 VITALS
OXYGEN SATURATION: 99 % | HEIGHT: 61 IN | WEIGHT: 137 LBS | HEART RATE: 83 BPM | DIASTOLIC BLOOD PRESSURE: 72 MMHG | BODY MASS INDEX: 25.86 KG/M2 | SYSTOLIC BLOOD PRESSURE: 114 MMHG

## 2024-01-31 PROCEDURE — 99215 OFFICE O/P EST HI 40 MIN: CPT

## 2024-01-31 NOTE — PHYSICAL EXAM
[Alert] : alert [Well Nourished] : well nourished [No Acute Distress] : no acute distress [Well Developed] : well developed [Normal Sclera/Conjunctiva] : normal sclera/conjunctiva [EOMI] : extra ocular movement intact [No Proptosis] : no proptosis [Normal Oropharynx] : the oropharynx was normal [No Thyroid Nodules] : no palpable thyroid nodules [No Respiratory Distress] : no respiratory distress [No Accessory Muscle Use] : no accessory muscle use [Normal Rate and Effort] : normal respiratory rate and effort [Clear to Auscultation] : lungs were clear to auscultation bilaterally [Normal S1, S2] : normal S1 and S2 [No Murmurs] : no murmurs [Normal Rate] : heart rate was normal [Regular Rhythm] : with a regular rhythm [No Rubs] : no pericardial rub [No Edema] : no peripheral edema [Normal Bowel Sounds] : normal bowel sounds [Not Tender] : non-tender [Not Distended] : not distended [Soft] : abdomen soft [Normal Anterior Cervical Nodes] : no anterior cervical lymphadenopathy [No Spinal Tenderness] : no spinal tenderness [Spine Straight] : spine straight [No Stigmata of Cushings Syndrome] : no stigmata of Cushings Syndrome [Normal Gait] : normal gait [Normal Strength/Tone] : muscle strength and tone were normal [No Rash] : no rash [Normal Reflexes] : deep tendon reflexes were 2+ and symmetric [No Tremors] : no tremors [Oriented x3] : oriented to person, place, and time [Acanthosis Nigricans] : no acanthosis nigricans [de-identified] : mildly enlarged thyroid on exam, mobile with swallowing

## 2024-01-31 NOTE — REVIEW OF SYSTEMS
[Fatigue] : fatigue [Heartburn] : heartburn [Joint Pain] : joint pain [Back Pain] : back pain [Dry Skin] : dry skin [Headaches] : headaches [Depression] : depression [Negative] : Heme/Lymph [Recent Weight Loss (___ Lbs)] : recent weight loss: [unfilled] lbs [Insomnia] : insomnia [Anxiety] : anxiety [FreeTextEntry8] : last period 41 yo , has had irregular periods before

## 2024-01-31 NOTE — HISTORY OF PRESENT ILLNESS
[FreeTextEntry1] : Last Prolia shot 12/23 Ms. ALY ARMAS is a 53 year old female coming for f/u hypothyroidism, osteoporosis  has also early menopause last period 2013 was on Prednisone 6 weeks ago for 5 days for asthma Dr Varela 11/14/22 [facility]  keeps getting the inhaler Fluticasone , her asthma was getting worse lately per mother  last Prolia end of last year 9/2019, was due march 2020 was afraid of COVID so she skipped it , also skipped 12/2020  Prolia 7/30/2018 2/13/2019 9/4/2019 6/2020 3/2021   Dr Raz ONOFRE started her on Omeprazole 10mg bid         on Vit D 2,000 IU qd for at least a month        has osteoporosis         had not been taking her meds end of December, also January and February each time for a week at a time due to gastroenteritis        labs reviewed with the pt, has low IGF1        TFts normal        has dot-cisterna magna sees Dr Silva Neurology last CT scan and last MRI head reviewed        labs done 2/19/15 showed TSH 2.6, FT4 0.8, none since thyroid was changed        has been on Synthroid since she was 14yo, on Synthroid 88mcg NAMAN changed 9/2016        has Osteopenia DEXA scan done by her Gyn at [facility] referred at [facility] done 1/17 mildy decreased , T score -2 both hip and spine  repeated DEXA 5/19 c/w osteoporosis T score -2.6 left femur, improved , forearm osteopenia -2.0        calcium 500+ Vit D qd takes it 4hr after Synthroid         iPTH 60,          takes Vit D 2,000 IU qd         no persistent Therapy with prednisone, has asthma         has been on Phentermine by PCP for 3 months lost 20lb , then 10lb more since she was sick        thyroid US reviewed showed small nodule 5mm nearby her thyroid, lymph node versus parathyroid adenoma, however iPTH 60 with low D, will repeat once D level is normal  labs reviewed very good   1/31/24 follow up-  Presenting with family.  Doing okay, has some anxiety and frustration. Anxiety a little stronger than usual Lost 3 lbs, now 137. Was hospitalized for stomach virus for 10 days at beginning of January with very little eating, which likely contributed to weight loss.  + Sleep issues, typically falls asleep by 2-3 am. Melatonin has helped. Takes Allegra, she is concerned if she tries Benadryl for sleep it would interfere with Allegra. She will consult her allergist about this.  Labs all perfect.

## 2024-04-04 ENCOUNTER — RX RENEWAL (OUTPATIENT)
Age: 54
End: 2024-04-04

## 2024-04-08 ENCOUNTER — RX RENEWAL (OUTPATIENT)
Age: 54
End: 2024-04-08

## 2024-04-08 RX ORDER — LEVOTHYROXINE SODIUM 88 UG/1
88 TABLET ORAL
Qty: 90 | Refills: 1 | Status: ACTIVE | COMMUNITY
Start: 2024-04-08 | End: 1900-01-01

## 2024-04-14 PROBLEM — J45.909 ASTHMA: Status: ACTIVE | Noted: 2019-01-04

## 2024-04-14 PROBLEM — Z00.00 ENCOUNTER FOR PREVENTIVE HEALTH EXAMINATION: Status: ACTIVE | Noted: 2018-10-23

## 2024-04-14 PROBLEM — E78.2 MIXED HYPERLIPIDEMIA: Status: ACTIVE | Noted: 2019-01-04

## 2024-04-14 PROBLEM — E78.5 DYSLIPIDEMIA: Status: ACTIVE | Noted: 2019-03-06

## 2024-04-18 ENCOUNTER — APPOINTMENT (OUTPATIENT)
Dept: CARDIOLOGY | Facility: CLINIC | Age: 54
End: 2024-04-18
Payer: MEDICARE

## 2024-04-18 ENCOUNTER — NON-APPOINTMENT (OUTPATIENT)
Age: 54
End: 2024-04-18

## 2024-04-18 VITALS
DIASTOLIC BLOOD PRESSURE: 52 MMHG | HEART RATE: 86 BPM | SYSTOLIC BLOOD PRESSURE: 98 MMHG | HEIGHT: 61 IN | OXYGEN SATURATION: 100 %

## 2024-04-18 DIAGNOSIS — E78.5 HYPERLIPIDEMIA, UNSPECIFIED: ICD-10-CM

## 2024-04-18 DIAGNOSIS — Z00.00 ENCOUNTER FOR GENERAL ADULT MEDICAL EXAMINATION W/OUT ABNORMAL FINDINGS: ICD-10-CM

## 2024-04-18 DIAGNOSIS — E78.2 MIXED HYPERLIPIDEMIA: ICD-10-CM

## 2024-04-18 DIAGNOSIS — J45.909 UNSPECIFIED ASTHMA, UNCOMPLICATED: ICD-10-CM

## 2024-04-18 PROCEDURE — 36415 COLL VENOUS BLD VENIPUNCTURE: CPT

## 2024-04-18 PROCEDURE — 99214 OFFICE O/P EST MOD 30 MIN: CPT | Mod: 25

## 2024-04-18 NOTE — HISTORY OF PRESENT ILLNESS
[FreeTextEntry1] :        The patient is a 53 year-old woman, daughter of MrMeg and Mrs. Camilo Ca, and who is well known to me from my previous practice at [facility]. She is under management of:        1) hyperlipidemia, on statin (Atorva)        2) long-standing asthma.        3) Treated Hypothyroidism, per Endo (Dr. Wolff) She is also using Synthroid chronically for hypothyroidism.        4) Headache syndrome, per neuro (Dr. FLAQUITA Silva)--seeing neuro about her recurrent and frequent headaches        5) Asthma remains under good control.  **Going to Dr. Ludin Dia for pulmonary,           Currently: She is feeling well. No symptoms. No chest pains and no dyspnea. Asthma is stable.  Seeing her other consultants regularly She has been taking and tolerating Lipitor without issue.  Her last cholesterol was 198 with an LDL of 116. I called her and she agreed to redouble her dietary efforts. She does not want to increase the dosage of atorvastatin.  EKG (12/16/2021): Sinus Rhythm  Nl axis and intervals Anterior t-wave changes persist, as before. Likely, normal variant

## 2024-04-18 NOTE — PHYSICAL EXAM
[General Appearance - Well Developed] : well developed [Normal Appearance] : normal appearance [Well Groomed] : well groomed [General Appearance - Well Nourished] : well nourished [No Deformities] : no deformities [General Appearance - In No Acute Distress] : no acute distress [Normal Conjunctiva] : the conjunctiva exhibited no abnormalities [Eyelids - No Xanthelasma] : the eyelids demonstrated no xanthelasmas [Normal Oral Mucosa] : normal oral mucosa [No Oral Pallor] : no oral pallor [No Oral Cyanosis] : no oral cyanosis [Normal Jugular Venous A Waves Present] : normal jugular venous A waves present [Normal Jugular Venous V Waves Present] : normal jugular venous V waves present [No Jugular Venous Bull A Waves] : no jugular venous bull A waves [Respiration, Rhythm And Depth] : normal respiratory rhythm and effort [Auscultation Breath Sounds / Voice Sounds] : lungs were clear to auscultation bilaterally [Exaggerated Use Of Accessory Muscles For Inspiration] : no accessory muscle use [Abdomen Soft] : soft [Abdomen Tenderness] : non-tender [Abdomen Mass (___ Cm)] : no abdominal mass palpated [Abnormal Walk] : normal gait [Gait - Sufficient For Exercise Testing] : the gait was sufficient for exercise testing [Nail Clubbing] : no clubbing of the fingernails [Petechial Hemorrhages (___cm)] : no petechial hemorrhages [Cyanosis, Localized] : no localized cyanosis [Skin Color & Pigmentation] : normal skin color and pigmentation [No Venous Stasis] : no venous stasis [] : no rash [Skin Lesions] : no skin lesions [No Skin Ulcers] : no skin ulcer [No Xanthoma] : no  xanthoma was observed [Oriented To Time, Place, And Person] : oriented to person, place, and time [Mood] : the mood was normal [Affect] : the affect was normal [No Anxiety] : not feeling anxious [5th Left ICS - MCL] : palpated at the 5th LICS in the midclavicular line [Normal] : normal [Normal Rate] : normal [Rhythm Regular] : regular [Normal S1] : normal S1 [No Murmur] : no murmurs heard [Normal S2] : normal S2 [No Abnormalities] : the abdominal aorta was not enlarged and no bruit was heard [Right Carotid Bruit] : no bruit heard over the right carotid [Left Carotid Bruit] : no bruit heard over the left carotid [Right Femoral Bruit] : no bruit heard over the right femoral artery [Left Femoral Bruit] : no bruit heard over the left femoral artery [Rt] : no varicose veins of the right leg [Lt] : no varicose veins of the left leg

## 2024-04-19 LAB
ALBUMIN SERPL ELPH-MCNC: 4 G/DL
ALP BLD-CCNC: 63 U/L
ALT SERPL-CCNC: 10 U/L
ANION GAP SERPL CALC-SCNC: 8 MMOL/L
AST SERPL-CCNC: 18 U/L
BILIRUB SERPL-MCNC: 0.2 MG/DL
BUN SERPL-MCNC: 17 MG/DL
CALCIUM SERPL-MCNC: 9 MG/DL
CHLORIDE SERPL-SCNC: 103 MMOL/L
CHOLEST SERPL-MCNC: 148 MG/DL
CO2 SERPL-SCNC: 33 MMOL/L
CREAT SERPL-MCNC: 1.02 MG/DL
EGFR: 65 ML/MIN/1.73M2
GLUCOSE SERPL-MCNC: 66 MG/DL
HCT VFR BLD CALC: 36.8 %
HDLC SERPL-MCNC: 50 MG/DL
HGB BLD-MCNC: 11.9 G/DL
LDLC SERPL CALC-MCNC: 75 MG/DL
MCHC RBC-ENTMCNC: 29.7 PG
MCHC RBC-ENTMCNC: 32.3 GM/DL
MCV RBC AUTO: 91.8 FL
NONHDLC SERPL-MCNC: 98 MG/DL
PLATELET # BLD AUTO: 235 K/UL
POTASSIUM SERPL-SCNC: 4 MMOL/L
PROT SERPL-MCNC: 5.9 G/DL
RBC # BLD: 4.01 M/UL
RBC # FLD: 12.7 %
SODIUM SERPL-SCNC: 144 MMOL/L
TRIGL SERPL-MCNC: 128 MG/DL
TSH SERPL-ACNC: 2.56 UIU/ML
WBC # FLD AUTO: 5.96 K/UL

## 2024-05-01 ENCOUNTER — APPOINTMENT (OUTPATIENT)
Dept: ENDOCRINOLOGY | Facility: CLINIC | Age: 54
End: 2024-05-01
Payer: MEDICARE

## 2024-05-01 ENCOUNTER — TRANSCRIPTION ENCOUNTER (OUTPATIENT)
Age: 54
End: 2024-05-01

## 2024-05-01 DIAGNOSIS — E23.0 HYPOPITUITARISM: ICD-10-CM

## 2024-05-01 DIAGNOSIS — R74.8 ABNORMAL LEVELS OF OTHER SERUM ENZYMES: ICD-10-CM

## 2024-05-01 DIAGNOSIS — E28.319 ASYMPTOMATIC PREMATURE MENOPAUSE: ICD-10-CM

## 2024-05-01 DIAGNOSIS — E55.9 VITAMIN D DEFICIENCY, UNSPECIFIED: ICD-10-CM

## 2024-05-01 DIAGNOSIS — E03.9 HYPOTHYROIDISM, UNSPECIFIED: ICD-10-CM

## 2024-05-01 DIAGNOSIS — E04.1 NONTOXIC SINGLE THYROID NODULE: ICD-10-CM

## 2024-05-01 DIAGNOSIS — E21.3 HYPERPARATHYROIDISM, UNSPECIFIED: ICD-10-CM

## 2024-05-01 DIAGNOSIS — R73.03 PREDIABETES.: ICD-10-CM

## 2024-05-01 DIAGNOSIS — M81.6 LOCALIZED OSTEOPOROSIS [LEQUESNE]: ICD-10-CM

## 2024-05-01 DIAGNOSIS — R53.83 OTHER FATIGUE: ICD-10-CM

## 2024-05-01 PROCEDURE — 99215 OFFICE O/P EST HI 40 MIN: CPT

## 2024-05-01 RX ORDER — LEVOTHYROXINE SODIUM 88 UG/1
88 TABLET ORAL
Qty: 90 | Refills: 2 | Status: ACTIVE | COMMUNITY
Start: 2022-02-23 | End: 1900-01-01

## 2024-05-01 NOTE — PHYSICAL EXAM
[Alert] : alert [Well Nourished] : well nourished [Healthy Appearance] : healthy appearance [No Acute Distress] : no acute distress [Well Developed] : well developed [Normal Voice/Communication] : normal voice communication [Normal Sclera/Conjunctiva] : normal sclera/conjunctiva [No Lid Lag] : no lid lag [Normal Retina] : normal retina [Normal Outer Ear/Nose] : the ears and nose were normal in appearance [Normal Hearing] : hearing was normal [Normal Lips/Gums] : the lips and gums were normal [No Accessory Muscle Use] : no accessory muscle use [No Respiratory Distress] : no respiratory distress [Oriented x3] : oriented to person, place, and time [Normal Affect] : the affect was normal [Normal Insight/Judgement] : insight and judgment were intact [Normal Mood] : the mood was normal

## 2024-05-01 NOTE — REASON FOR VISIT
[Follow - Up] : a follow-up visit [Hypothyroidism] : hypothyroidism [Osteoporosis] : osteoporosis [Parent] : parent [Home] : at home, [unfilled] , at the time of the visit. [Medical Office: ([facility])___] : at the medical office located in  [Patient] : the patient

## 2024-05-02 NOTE — END OF VISIT
[Time Spent: ___ minutes] : I have spent [unfilled] minutes of time on the encounter. [FreeTextEntry3] :  I, Manuel Becerra, am scribing for and in the presence of Dr. Rossi Wolff in the following sections: HISTORY OF PRESENT ILLNESS; REVIEW OF SYSTEMS; PHYSICAL EXAM; ASSESSMENT/ PLAN. I, Rossi Wolff, personally performed the services described in the documentation, reviewed the documentation recorded by the scribe in my presence, and it accurately and completely records my words and actions. 5/1/2024.

## 2024-05-02 NOTE — REVIEW OF SYSTEMS
[Fatigue] : fatigue [Recent Weight Loss (___ Lbs)] : recent weight loss: [unfilled] lbs [Heartburn] : heartburn [Joint Pain] : joint pain [Back Pain] : back pain [Dry Skin] : dry skin [Headaches] : headaches [Depression] : depression [Insomnia] : insomnia [Anxiety] : anxiety [Negative] : Heme/Lymph [FreeTextEntry8] : last period 41 yo , has had irregular periods before

## 2024-08-15 ENCOUNTER — APPOINTMENT (OUTPATIENT)
Dept: CARDIOLOGY | Facility: CLINIC | Age: 54
End: 2024-08-15
Payer: MEDICARE

## 2024-08-15 ENCOUNTER — NON-APPOINTMENT (OUTPATIENT)
Age: 54
End: 2024-08-15

## 2024-08-15 VITALS
SYSTOLIC BLOOD PRESSURE: 110 MMHG | WEIGHT: 136 LBS | HEIGHT: 61 IN | BODY MASS INDEX: 25.68 KG/M2 | OXYGEN SATURATION: 99 % | DIASTOLIC BLOOD PRESSURE: 70 MMHG | HEART RATE: 70 BPM

## 2024-08-15 DIAGNOSIS — E21.3 HYPERPARATHYROIDISM, UNSPECIFIED: ICD-10-CM

## 2024-08-15 DIAGNOSIS — Z00.00 ENCOUNTER FOR GENERAL ADULT MEDICAL EXAMINATION W/OUT ABNORMAL FINDINGS: ICD-10-CM

## 2024-08-15 DIAGNOSIS — E78.5 HYPERLIPIDEMIA, UNSPECIFIED: ICD-10-CM

## 2024-08-15 DIAGNOSIS — E03.9 HYPOTHYROIDISM, UNSPECIFIED: ICD-10-CM

## 2024-08-15 DIAGNOSIS — J45.909 UNSPECIFIED ASTHMA, UNCOMPLICATED: ICD-10-CM

## 2024-08-15 PROCEDURE — 99214 OFFICE O/P EST MOD 30 MIN: CPT | Mod: 25

## 2024-08-15 PROCEDURE — 36415 COLL VENOUS BLD VENIPUNCTURE: CPT

## 2024-08-15 PROCEDURE — 93000 ELECTROCARDIOGRAM COMPLETE: CPT

## 2024-08-15 NOTE — PHYSICAL EXAM
[Alert] : alert [Well Nourished] : well nourished [Healthy Appearance] : healthy appearance [No Acute Distress] : no acute distress [Well Developed] : well developed [Normal Voice/Communication] : normal voice communication [Normal Sclera/Conjunctiva] : normal sclera/conjunctiva [No Lid Lag] : no lid lag [Normal Retina] : normal retina [Normal Outer Ear/Nose] : the ears and nose were normal in appearance [Normal Hearing] : hearing was normal [Normal Lips/Gums] : the lips and gums were normal [No Respiratory Distress] : no respiratory distress [No Accessory Muscle Use] : no accessory muscle use [Oriented x3] : oriented to person, place, and time [Normal Affect] : the affect was normal [Normal Insight/Judgement] : insight and judgment were intact [Normal Mood] : the mood was normal [General Appearance - Well Developed] : well developed [Normal Appearance] : normal appearance [Well Groomed] : well groomed [General Appearance - Well Nourished] : well nourished [No Deformities] : no deformities [General Appearance - In No Acute Distress] : no acute distress [Normal Conjunctiva] : the conjunctiva exhibited no abnormalities [Eyelids - No Xanthelasma] : the eyelids demonstrated no xanthelasmas [Normal Oral Mucosa] : normal oral mucosa [No Oral Pallor] : no oral pallor [No Oral Cyanosis] : no oral cyanosis [Normal Jugular Venous A Waves Present] : normal jugular venous A waves present [Normal Jugular Venous V Waves Present] : normal jugular venous V waves present [No Jugular Venous Bull A Waves] : no jugular venous bull A waves [Respiration, Rhythm And Depth] : normal respiratory rhythm and effort [Exaggerated Use Of Accessory Muscles For Inspiration] : no accessory muscle use [Auscultation Breath Sounds / Voice Sounds] : lungs were clear to auscultation bilaterally [Abdomen Soft] : soft [Abdomen Tenderness] : non-tender [Abdomen Mass (___ Cm)] : no abdominal mass palpated [Abnormal Walk] : normal gait [Gait - Sufficient For Exercise Testing] : the gait was sufficient for exercise testing [Nail Clubbing] : no clubbing of the fingernails [Cyanosis, Localized] : no localized cyanosis [Petechial Hemorrhages (___cm)] : no petechial hemorrhages [Skin Color & Pigmentation] : normal skin color and pigmentation [] : no rash [No Venous Stasis] : no venous stasis [Skin Lesions] : no skin lesions [No Skin Ulcers] : no skin ulcer [No Xanthoma] : no  xanthoma was observed [Oriented To Time, Place, And Person] : oriented to person, place, and time [Affect] : the affect was normal [Mood] : the mood was normal [No Anxiety] : not feeling anxious [5th Left ICS - MCL] : palpated at the 5th LICS in the midclavicular line [Normal] : normal [Normal Rate] : normal [Rhythm Regular] : regular [Normal S1] : normal S1 [Normal S2] : normal S2 [No Murmur] : no murmurs heard [No Abnormalities] : the abdominal aorta was not enlarged and no bruit was heard [Right Carotid Bruit] : no bruit heard over the right carotid [Left Carotid Bruit] : no bruit heard over the left carotid [Right Femoral Bruit] : no bruit heard over the right femoral artery [Left Femoral Bruit] : no bruit heard over the left femoral artery [Rt] : no varicose veins of the right leg [Lt] : no varicose veins of the left leg

## 2024-08-15 NOTE — PHYSICAL EXAM
[Alert] : alert [Well Nourished] : well nourished [Healthy Appearance] : healthy appearance [No Acute Distress] : no acute distress [Well Developed] : well developed [Normal Voice/Communication] : normal voice communication [Normal Sclera/Conjunctiva] : normal sclera/conjunctiva [No Lid Lag] : no lid lag [Normal Retina] : normal retina [Normal Outer Ear/Nose] : the ears and nose were normal in appearance [Normal Hearing] : hearing was normal [Normal Lips/Gums] : the lips and gums were normal [No Respiratory Distress] : no respiratory distress [No Accessory Muscle Use] : no accessory muscle use [Oriented x3] : oriented to person, place, and time [Normal Affect] : the affect was normal [Normal Insight/Judgement] : insight and judgment were intact [Normal Mood] : the mood was normal [General Appearance - Well Developed] : well developed [Normal Appearance] : normal appearance [Well Groomed] : well groomed [General Appearance - Well Nourished] : well nourished [No Deformities] : no deformities [General Appearance - In No Acute Distress] : no acute distress [Normal Conjunctiva] : the conjunctiva exhibited no abnormalities [Eyelids - No Xanthelasma] : the eyelids demonstrated no xanthelasmas [Normal Oral Mucosa] : normal oral mucosa [No Oral Pallor] : no oral pallor [No Oral Cyanosis] : no oral cyanosis [Normal Jugular Venous A Waves Present] : normal jugular venous A waves present [Normal Jugular Venous V Waves Present] : normal jugular venous V waves present [No Jugular Venous Bull A Waves] : no jugular venous bull A waves [Respiration, Rhythm And Depth] : normal respiratory rhythm and effort [Exaggerated Use Of Accessory Muscles For Inspiration] : no accessory muscle use [Auscultation Breath Sounds / Voice Sounds] : lungs were clear to auscultation bilaterally [Abdomen Soft] : soft [Abdomen Tenderness] : non-tender [Abdomen Mass (___ Cm)] : no abdominal mass palpated [Abnormal Walk] : normal gait [Gait - Sufficient For Exercise Testing] : the gait was sufficient for exercise testing [Nail Clubbing] : no clubbing of the fingernails [Cyanosis, Localized] : no localized cyanosis [Petechial Hemorrhages (___cm)] : no petechial hemorrhages [] : no rash [Skin Color & Pigmentation] : normal skin color and pigmentation [No Venous Stasis] : no venous stasis [Skin Lesions] : no skin lesions [No Skin Ulcers] : no skin ulcer [No Xanthoma] : no  xanthoma was observed [Oriented To Time, Place, And Person] : oriented to person, place, and time [Affect] : the affect was normal [Mood] : the mood was normal [No Anxiety] : not feeling anxious [5th Left ICS - MCL] : palpated at the 5th LICS in the midclavicular line [Normal] : normal [Normal Rate] : normal [Rhythm Regular] : regular [Normal S1] : normal S1 [Normal S2] : normal S2 [No Murmur] : no murmurs heard [No Abnormalities] : the abdominal aorta was not enlarged and no bruit was heard [Right Carotid Bruit] : no bruit heard over the right carotid [Left Carotid Bruit] : no bruit heard over the left carotid [Right Femoral Bruit] : no bruit heard over the right femoral artery [Left Femoral Bruit] : no bruit heard over the left femoral artery [Rt] : no varicose veins of the right leg [Lt] : no varicose veins of the left leg

## 2024-08-15 NOTE — HISTORY OF PRESENT ILLNESS
[Home] : at home, [unfilled] , at the time of the visit. [Medical Office: ([facility])___] : at the medical office located in  [Verbal consent obtained from patient] : the patient, [unfilled] [FreeTextEntry1] :        The patient is a 53 year-old woman, daughter of MrMeg and Mrs. Camilo Ca, and who is well known to me from my previous practice at [facility]. She is under management of:        1) hyperlipidemia, on statin (Atorva)        2) long-standing asthma.        3) Treated Hypothyroidism, per Endo (Dr. Wolff) She is also using Synthroid chronically for hypothyroidism.        4) Headache syndrome, per neuro (Dr. FLAQUITA Silva)--seeing neuro about her recurrent and frequent headaches        5) Asthma remains under good control.  **Going to Dr. Ludin Dia for pulmonary,           Currently: She is feeling well. No symptoms. No chest pains and no dyspnea. Asthma is stable.  Seeing her other consultants regularly She has been taking and tolerating Lipitor without issue.  Her last cholesterol was 198 with an LDL of 116. I called her and she agreed to redouble her dietary efforts. She does not want to increase the dosage of atorvastatin.  EKG performed today as part of cardiac reevaluation, given patients ongoing issues and symptoms: Sinus Rhythm  Nl axis and intervals Anterior t-wave changes persist, as before. Likely, normal variant

## 2024-08-15 NOTE — HISTORY OF PRESENT ILLNESS
[Home] : at home, [unfilled] , at the time of the visit. [Medical Office: ([facility])___] : at the medical office located in  [Verbal consent obtained from patient] : the patient, [unfilled] [FreeTextEntry1] :        The patient is a 53 year-old woman, daughter of MrMeg and Mrs. Camilo Ca, and who is well known to me from my previous practice at [facility]. She is under management of:        1) hyperlipidemia, on statin (Atorva)        2) long-standing asthma.        3) Treated Hypothyroidism, per Endo (Dr. Wolff) She is also using Synthroid chronically for hypothyroidism.        4) Headache syndrome, per neuro (Dr. FLAQUITA Silva)--seeing neuro about her recurrent and frequent headaches        5) Asthma remains under good control.  **Going to Dr. Ludin Dia for pulmonary,           Currently: She is feeling well. No symptoms. No chest pains and no dyspnea. Asthma is stable.  Seeing her other consultants regularly She has been taking and tolerating Lipitor without issue.  Her last cholesterol was 198 with an LDL of 116. I called her and she agreed to redouble her dietary efforts. She does not want to increase the dosage of atorvastatin.  EKG performed today as part of cardiac reevaluation, given patients ongoing issues and symptoms: Sinus Rhythm  Nl axis and intervals Anterior t-wave changes persist, as before. Likely, normal variant

## 2024-08-15 NOTE — REVIEW OF SYSTEMS
[Fatigue] : fatigue [Recent Weight Loss (___ Lbs)] : recent weight loss: [unfilled] lbs [Heartburn] : heartburn [Joint Pain] : joint pain [Back Pain] : back pain [Dry Skin] : dry skin [Headaches] : headaches [Depression] : depression [Insomnia] : insomnia [Anxiety] : anxiety [Negative] : Heme/Lymph [FreeTextEntry8] : last period 43 yo , has had irregular periods before

## 2024-08-15 NOTE — REASON FOR VISIT
[Follow - Up] : a follow-up visit [Hypothyroidism] : hypothyroidism [Osteoporosis] : osteoporosis [Parent] : parent [FreeTextEntry1] : 1. Hyperlipidemia. \par  2. Asthma. \par  3. Treated hypothyroidism.

## 2024-08-16 LAB
ALBUMIN SERPL ELPH-MCNC: 4.3 G/DL
ALP BLD-CCNC: 67 U/L
ALT SERPL-CCNC: 15 U/L
ANION GAP SERPL CALC-SCNC: 13 MMOL/L
AST SERPL-CCNC: 21 U/L
BILIRUB SERPL-MCNC: 0.2 MG/DL
BUN SERPL-MCNC: 21 MG/DL
CALCIUM SERPL-MCNC: 9.3 MG/DL
CHLORIDE SERPL-SCNC: 102 MMOL/L
CHOLEST SERPL-MCNC: 159 MG/DL
CO2 SERPL-SCNC: 25 MMOL/L
CREAT SERPL-MCNC: 0.86 MG/DL
EGFR: 80 ML/MIN/1.73M2
GLUCOSE SERPL-MCNC: 81 MG/DL
HCT VFR BLD CALC: 39.5 %
HDLC SERPL-MCNC: 50 MG/DL
HGB BLD-MCNC: 12.6 G/DL
LDLC SERPL CALC-MCNC: 87 MG/DL
MCHC RBC-ENTMCNC: 29.7 PG
MCHC RBC-ENTMCNC: 31.9 GM/DL
MCV RBC AUTO: 93.2 FL
NONHDLC SERPL-MCNC: 108 MG/DL
PLATELET # BLD AUTO: 208 K/UL
POTASSIUM SERPL-SCNC: 4.6 MMOL/L
PROT SERPL-MCNC: 6.3 G/DL
RBC # BLD: 4.24 M/UL
RBC # FLD: 13.3 %
SODIUM SERPL-SCNC: 140 MMOL/L
TRIGL SERPL-MCNC: 121 MG/DL
WBC # FLD AUTO: 5.45 K/UL

## 2024-09-13 ENCOUNTER — APPOINTMENT (OUTPATIENT)
Dept: ENDOCRINOLOGY | Facility: CLINIC | Age: 54
End: 2024-09-13

## 2025-01-02 ENCOUNTER — APPOINTMENT (OUTPATIENT)
Dept: CARDIOLOGY | Facility: CLINIC | Age: 55
End: 2025-01-02

## 2025-01-02 DIAGNOSIS — Z00.00 ENCOUNTER FOR GENERAL ADULT MEDICAL EXAMINATION W/OUT ABNORMAL FINDINGS: ICD-10-CM

## 2025-01-02 DIAGNOSIS — E21.3 HYPERPARATHYROIDISM, UNSPECIFIED: ICD-10-CM

## 2025-01-02 DIAGNOSIS — E78.5 HYPERLIPIDEMIA, UNSPECIFIED: ICD-10-CM

## 2025-01-02 DIAGNOSIS — E03.9 HYPOTHYROIDISM, UNSPECIFIED: ICD-10-CM

## 2025-07-07 ENCOUNTER — NON-APPOINTMENT (OUTPATIENT)
Age: 55
End: 2025-07-07